# Patient Record
Sex: FEMALE | Race: WHITE | NOT HISPANIC OR LATINO | Employment: UNEMPLOYED | URBAN - METROPOLITAN AREA
[De-identification: names, ages, dates, MRNs, and addresses within clinical notes are randomized per-mention and may not be internally consistent; named-entity substitution may affect disease eponyms.]

---

## 2017-12-04 ENCOUNTER — TELEPHONE (OUTPATIENT)
Dept: OBSTETRICS AND GYNECOLOGY | Facility: CLINIC | Age: 47
End: 2017-12-04

## 2017-12-04 NOTE — TELEPHONE ENCOUNTER
----- Message from Martina Romano sent at 12/4/2017  9:35 AM CST -----  Contact: Self  Pt has OV scheduled today and states she has stared her cycle. Pt wants to know if she needs to reschedule. Pt can be reached @ [phone].

## 2017-12-19 ENCOUNTER — OFFICE VISIT (OUTPATIENT)
Dept: OBSTETRICS AND GYNECOLOGY | Facility: CLINIC | Age: 47
End: 2017-12-19
Payer: MEDICAID

## 2017-12-19 VITALS
DIASTOLIC BLOOD PRESSURE: 72 MMHG | HEIGHT: 63 IN | TEMPERATURE: 99 F | SYSTOLIC BLOOD PRESSURE: 124 MMHG | WEIGHT: 236.31 LBS | BODY MASS INDEX: 41.87 KG/M2

## 2017-12-19 DIAGNOSIS — E66.01 OBESITY, CLASS III, BMI 40-49.9 (MORBID OBESITY): ICD-10-CM

## 2017-12-19 DIAGNOSIS — N92.1 MENORRHAGIA WITH IRREGULAR CYCLE: ICD-10-CM

## 2017-12-19 DIAGNOSIS — E03.9 ACQUIRED HYPOTHYROIDISM: ICD-10-CM

## 2017-12-19 DIAGNOSIS — D64.9 ANEMIA, UNSPECIFIED TYPE: ICD-10-CM

## 2017-12-19 DIAGNOSIS — E11.9 TYPE 2 DIABETES MELLITUS WITHOUT COMPLICATION, WITHOUT LONG-TERM CURRENT USE OF INSULIN: ICD-10-CM

## 2017-12-19 DIAGNOSIS — Z01.419 WELL WOMAN EXAM WITH ROUTINE GYNECOLOGICAL EXAM: Primary | ICD-10-CM

## 2017-12-19 DIAGNOSIS — I10 ESSENTIAL HYPERTENSION: ICD-10-CM

## 2017-12-19 PROCEDURE — 88175 CYTOPATH C/V AUTO FLUID REDO: CPT

## 2017-12-19 PROCEDURE — 99213 OFFICE O/P EST LOW 20 MIN: CPT | Mod: PBBFAC | Performed by: OBSTETRICS & GYNECOLOGY

## 2017-12-19 PROCEDURE — 99386 PREV VISIT NEW AGE 40-64: CPT | Mod: S$PBB,,, | Performed by: OBSTETRICS & GYNECOLOGY

## 2017-12-19 PROCEDURE — 99999 PR PBB SHADOW E&M-EST. PATIENT-LVL III: CPT | Mod: PBBFAC,,, | Performed by: OBSTETRICS & GYNECOLOGY

## 2017-12-19 RX ORDER — HYDROCHLOROTHIAZIDE 12.5 MG/1
12.5 CAPSULE ORAL DAILY
Refills: 4 | Status: ON HOLD | COMMUNITY
Start: 2017-11-08 | End: 2018-07-27 | Stop reason: HOSPADM

## 2017-12-19 RX ORDER — OMEPRAZOLE 20 MG/1
20 CAPSULE, DELAYED RELEASE ORAL DAILY
Refills: 4 | Status: ON HOLD | COMMUNITY
Start: 2017-11-08 | End: 2018-07-27 | Stop reason: HOSPADM

## 2017-12-19 RX ORDER — IBUPROFEN 600 MG/1
600 TABLET ORAL 3 TIMES DAILY
Refills: 2 | Status: ON HOLD | COMMUNITY
Start: 2017-11-14 | End: 2018-07-27 | Stop reason: HOSPADM

## 2017-12-19 NOTE — PATIENT INSTRUCTIONS
Please call [phone] to schedule pelvic ultrasound  Back in about 4 weeks for follow-up and endometrial biopsy

## 2017-12-19 NOTE — PROGRESS NOTES
Subjective:       Patient ID: Nicolle Kirk is a 47 y.o. female.    Chief Complaint:  Gynecologic Exam (NP complaining of frequent bleeding for her cycle)      History of Present Illness  HPI  Annual Exam-Premenopausal  Patient presents for annual exam. The patient has multiple complaints today. The patient is sexually active. GYN screening history: last pap: patient does not recall when last pap was and last mammogram: approximate date 2017 at [facility] and was normal. The patient wears seatbelts: yes. The patient participates in regular exercise: yes. Has the patient ever been transfused or tattooed?: yes. The patient reports that there is not domestic violence in her life.    Patient with history of menorrhagia, bleeding every two weeks or less.  History of hypertension, type 2 diabetes mellitus, hypothyroidism and severe obesity.  New partner for the past year.    Blood work in 2017.  HgbA1c- 6.1  TSH- 12.3  Hematocrit- 28  EKG/CXR- Neg/Neg    Status post  sections with tubal ligation    GYN & OB History  Patient's last menstrual period was 2017 (exact date).   Date of Last Pap: No result found    OB History    Para Term  AB Living   3 3 2 1   4   SAB TAB Ectopic Multiple Live Births         1 4      # Outcome Date GA Lbr [place]/2nd Weight Sex Delivery Anes PTL Lv   3A   35w0d  2.977 kg (6 lb 9 oz) F CS-LTranv  Y MIRA   3B   35w0d  3.005 kg (6 lb 10 oz) M CS-LTranv  Y MIRA   2 Term  38w0d  2.977 kg (6 lb 9 oz) F CS-LTranv  N MIRA   1 Term  40w0d  2.92 kg (6 lb 7 oz) F Vag-Spont  N MIRA          Review of Systems  Review of Systems   Constitutional: Positive for fatigue. Negative for activity change, appetite change, chills, fever and unexpected weight change.   HENT: Negative for mouth sores.    Respiratory: Negative for cough, shortness of breath and wheezing.    Cardiovascular: Negative for chest pain and palpitations.    Gastrointestinal: Positive for abdominal pain. Negative for bloating, blood in stool, constipation, nausea and vomiting.   Endocrine: Negative for diabetes and hot flashes.   Genitourinary: Positive for menorrhagia, menstrual problem, pelvic pain, vaginal bleeding and dysmenorrhea. Negative for dyspareunia, dysuria, frequency, hematuria, urgency, vaginal discharge, vaginal pain, urinary incontinence, postcoital bleeding and vaginal odor.   Musculoskeletal: Negative for back pain and myalgias.   Skin:  Negative for rash.   Neurological: Negative for seizures and headaches.   Psychiatric/Behavioral: Negative for depression and sleep disturbance. The patient is not nervous/anxious.    Breast: Negative for breast mass, breast pain and nipple discharge          Objective:    Physical Exam:   Constitutional: She appears well-developed and well-nourished. No distress.    HENT:   Head: Normocephalic and atraumatic.    Eyes: EOM are normal.    Neck: Normal range of motion.     Pulmonary/Chest: Effort normal. No respiratory distress.   Breasts: Non-tender, no engorgement, no masses, no retraction, no discharge. Negative for lymphadenopathy.         Abdominal: Soft. She exhibits no distension. There is no tenderness. There is no rebound and no guarding.   Pfannenstiel scars healed well.     Genitourinary: Vagina normal. No vaginal discharge found.   Genitourinary Comments: Vulva without any obvious lesions.  Vaginal vault with good support.  Minimal clear mucus discharge noted.  No obvious lesion.  Cervix is without any cervical motion tenderness.  No obvious lesion.  Uterus and  Adnexa are difficult to assess secondary to body habitus           Musculoskeletal: Normal range of motion.       Neurological: She is alert.    Skin: Skin is warm and dry.    Psychiatric: She has a normal mood and affect.          Assessment:        1. Well woman exam with routine gynecological exam    2. Essential hypertension    3. Obesity, Class  III, BMI 40-49.9 (morbid obesity)    4. Type 2 diabetes mellitus without complication, without long-term current use of insulin    5. Acquired hypothyroidism    6.  Menorrhagia and Anemia.  Possible uterine fibroids          Plan:          I have discussed with the patient her condition.  Monthly breast examination was instructed, discussed, and encouraged.  Patient was encouraged to consume a low-calorie, low fat diet, and to increase of physical activity.  Healthy habits encouraged.  She would need to lose weight.  A Pap smear was performed.  Mammogram was not ordered because it was done within a year.  Gonorrhea and Chlamydia testing not performed.  HIV test not ordered.  Patient is to continue her medications as prescribed by her PCP.  She will come back to see me in one year for her annual visit.  She can come back to see me sooner as necessary.  All of her questions were answered appropriately to her satisfaction.    She would need a pelvic ultrasound and EMB soon  Back for EMB    Might need hysterectomy

## 2018-01-16 ENCOUNTER — TELEPHONE (OUTPATIENT)
Dept: OBSTETRICS AND GYNECOLOGY | Facility: CLINIC | Age: 48
End: 2018-01-16

## 2018-01-16 NOTE — TELEPHONE ENCOUNTER
Spoke with pt. Pt could not make todays appointment due to having the flu. Pt canceled appointment and stated she will call us to reschedule.

## 2018-01-16 NOTE — TELEPHONE ENCOUNTER
----- Message from Martina Romano sent at 1/16/2018 12:31 PM CST -----  Contact: Self  Pt returned [phone].

## 2018-01-29 ENCOUNTER — TELEPHONE (OUTPATIENT)
Dept: OBSTETRICS AND GYNECOLOGY | Facility: CLINIC | Age: 48
End: 2018-01-29

## 2018-01-29 NOTE — TELEPHONE ENCOUNTER
Contacted pt to reschedule appointment. Pt stated she will return our call and schedule EMBX once she gets her ultrasound

## 2018-03-23 ENCOUNTER — HOSPITAL ENCOUNTER (OUTPATIENT)
Dept: RADIOLOGY | Facility: HOSPITAL | Age: 48
Discharge: HOME | End: 2018-03-23
Attending: OBSTETRICS & GYNECOLOGY
Payer: MEDICAID

## 2018-03-23 DIAGNOSIS — N92.1 MENORRHAGIA WITH IRREGULAR CYCLE: ICD-10-CM

## 2018-03-23 DIAGNOSIS — D64.9 ANEMIA, UNSPECIFIED TYPE: ICD-10-CM

## 2018-03-23 PROCEDURE — 76830 TRANSVAGINAL US NON-OB: CPT | Mod: TC

## 2018-03-23 PROCEDURE — 76856 US EXAM PELVIC COMPLETE: CPT | Mod: 26,,, | Performed by: RADIOLOGY

## 2018-03-23 PROCEDURE — 76830 TRANSVAGINAL US NON-OB: CPT | Mod: 26,,, | Performed by: RADIOLOGY

## 2018-03-26 ENCOUNTER — TELEPHONE (OUTPATIENT)
Dept: OBSTETRICS AND GYNECOLOGY | Facility: CLINIC | Age: 48
End: 2018-03-26

## 2018-03-26 NOTE — TELEPHONE ENCOUNTER
Notes recorded by Tash Vasquez MA on 3/26/2018 at 11:15 AM CDT  Patient aware  would like to discuss results at visit. Patient will call to schedule appointment

## 2018-03-26 NOTE — TELEPHONE ENCOUNTER
----- Message from Michael Childress MD sent at 3/25/2018  3:23 PM CDT -----  Will discuss on return visit    Michael Childress MD

## 2018-04-19 ENCOUNTER — OFFICE VISIT (OUTPATIENT)
Dept: OBSTETRICS AND GYNECOLOGY | Facility: CLINIC | Age: 48
End: 2018-04-19
Payer: MEDICAID

## 2018-04-19 VITALS
TEMPERATURE: 99 F | HEIGHT: 63 IN | SYSTOLIC BLOOD PRESSURE: 132 MMHG | WEIGHT: 248.69 LBS | DIASTOLIC BLOOD PRESSURE: 70 MMHG | BODY MASS INDEX: 44.06 KG/M2

## 2018-04-19 DIAGNOSIS — E66.01 OBESITY, CLASS III, BMI 40-49.9 (MORBID OBESITY): ICD-10-CM

## 2018-04-19 DIAGNOSIS — D25.9 UTERINE LEIOMYOMA, UNSPECIFIED LOCATION: Primary | ICD-10-CM

## 2018-04-19 DIAGNOSIS — I10 ESSENTIAL HYPERTENSION: ICD-10-CM

## 2018-04-19 DIAGNOSIS — E11.9 TYPE 2 DIABETES MELLITUS WITHOUT COMPLICATION, WITHOUT LONG-TERM CURRENT USE OF INSULIN: ICD-10-CM

## 2018-04-19 DIAGNOSIS — E03.9 ACQUIRED HYPOTHYROIDISM: ICD-10-CM

## 2018-04-19 PROCEDURE — 99213 OFFICE O/P EST LOW 20 MIN: CPT | Mod: PBBFAC | Performed by: OBSTETRICS & GYNECOLOGY

## 2018-04-19 PROCEDURE — 99213 OFFICE O/P EST LOW 20 MIN: CPT | Mod: S$PBB,,, | Performed by: OBSTETRICS & GYNECOLOGY

## 2018-04-19 PROCEDURE — 99999 PR PBB SHADOW E&M-EST. PATIENT-LVL III: CPT | Mod: PBBFAC,,, | Performed by: OBSTETRICS & GYNECOLOGY

## 2018-04-19 RX ORDER — CETIRIZINE HYDROCHLORIDE 10 MG/1
TABLET ORAL
Status: ON HOLD | COMMUNITY
Start: 2018-04-19 | End: 2018-07-27 | Stop reason: HOSPADM

## 2018-04-19 NOTE — PROGRESS NOTES
Subjective:       Patient ID: Nicolle Kirk is a 47 y.o. female.    Chief Complaint:  Follow-up (follow up ultrasound on 18)      History of Present Illness  HPI  Patient comes in today with her partner for follow-up  Still with menorrhagia and dysmenorrhea    Pelvic ultrasound on 3/23/2018 shows  There is a lobulated uterus that measures approximately 13.5 x 10.4 x 8.7 cm with significant distortion of the endometrial stripe.  There is a large heterogeneous uterine fibroid along the posterior body of the uterus measuring approximately 8.1 x 8.4 x 6.7 cm.  The large uterine fibroid displaces and distorts the endometrial stripe.  In the anterior lower uterine segment there is a small fibroid that measures 2 x 3 x 2.4 cm.  The right ovary measures 3.8 x 3.2 x 2.7 cm.  Multiple follicles or a large septated cyst measures approximately 3.2 x 2.5 x 2 cm.  No abnormal fluid collections in the adnexa.  The left ovary measures 3.3 x 2.85 cm with normal vascular flow.    Previous  sections x 2 with tubal ligation      GYN & OB History  Patient's last menstrual period was 2018 (within days).   Date of Last Pap: 2017    OB History    Para Term  AB Living   3 3 2 1   4   SAB TAB Ectopic Multiple Live Births         1 4      # Outcome Date GA Lbr [place]/2nd Weight Sex Delivery Anes PTL Lv   3A   35w0d  2.977 kg (6 lb 9 oz) F CS-LTranv  Y MIRA   3B   35w0d  3.005 kg (6 lb 10 oz) M CS-LTranv  Y MIRA   2 Term  38w0d  2.977 kg (6 lb 9 oz) F CS-LTranv  N MIRA   1 Term  40w0d  2.92 kg (6 lb 7 oz) F Vag-Spont  N MIRA        Past Medical History:   Diagnosis Date    Diabetes mellitus     Fibroids     Hypertension     Obesity, Class III, BMI 40-49.9 (morbid obesity)     Thyroid disease        Past Surgical History:   Procedure Laterality Date     SECTION      ORIF left arm Left 2012    Fall on wet floor    TUBAL LIGATION         Family History   Problem  Relation Age of Onset    Hyperthyroidism Paternal Grandmother     Diabetes Father     Hypertension Father     Diabetes Mother     Hypertension Mother        Social History     Social History    Marital status:      Spouse name: N/A    Number of children: N/A    Years of education: N/A     Social History Main Topics    Smoking status: Never Smoker    Smokeless tobacco: Never Used    Alcohol use Yes      Comment: occasionally    Drug use: Yes     Types: Marijuana    Sexual activity: Yes     Partners: Male     Birth control/ protection: See Surgical Hx     Other Topics Concern    None     Social History Narrative    Together since 7/1/2016    He is a .  Now working as a     She is a homemaker       Current Outpatient Prescriptions   Medication Sig Dispense Refill    cetirizine (ZYRTEC) 10 MG tablet       hydroCHLOROthiazide (MICROZIDE) 12.5 mg capsule Take 12.5 mg by mouth once daily.  4    ibuprofen (ADVIL,MOTRIN) 600 MG tablet Take 600 mg by mouth 3 (three) times daily.  2    omeprazole (PRILOSEC) 20 MG capsule Take 20 mg by mouth once daily.  4     No current facility-administered medications for this visit.        Review of patient's allergies indicates:  No Known Allergies    Review of Systems  Review of Systems   Constitutional: Positive for fatigue. Negative for activity change, appetite change, chills, fever and unexpected weight change.   HENT: Negative for mouth sores.    Respiratory: Negative for cough, shortness of breath and wheezing.    Cardiovascular: Negative for chest pain and palpitations.   Gastrointestinal: Positive for abdominal pain. Negative for bloating, blood in stool, constipation, nausea and vomiting.   Endocrine: Negative for diabetes and hot flashes.   Genitourinary: Positive for dyspareunia, menorrhagia, menstrual problem, pelvic pain and dysmenorrhea. Negative for dysuria, frequency, hematuria, urgency, vaginal bleeding, vaginal discharge,  vaginal pain, urinary incontinence, postcoital bleeding and vaginal odor.   Musculoskeletal: Negative for back pain and myalgias.   Skin:  Negative for rash.   Neurological: Negative for seizures and headaches.   Psychiatric/Behavioral: Negative for depression and sleep disturbance. The patient is not nervous/anxious.    Breast: Negative for breast mass, breast pain and nipple discharge          Objective:    Physical Exam:   Constitutional: She appears well-developed and well-nourished. No distress.   Obese      HENT:   Head: Normocephalic and atraumatic.    Eyes: EOM are normal.    Neck: Normal range of motion.    Cardiovascular: Normal rate.     Pulmonary/Chest: Effort normal. No respiratory distress.                  Musculoskeletal: Normal range of motion.       Neurological: She is alert.    Skin: Skin is warm and dry.    Psychiatric: She has a normal mood and affect.          Assessment:        1. Uterine leiomyoma, unspecified location    2. Obesity, Class III, BMI 40-49.9 (morbid obesity)    3. Essential hypertension    4. Type 2 diabetes mellitus without complication, without long-term current use of insulin    5. Acquired hypothyroidism              Plan:      I have extensively discussed with the patient and her partner regarding her condition  She would eventually need total abdominal hysterectomy with bilateral salpingectomy  Patient with multiple medical problems  Will perform basic blood work  CBC, CMP, TSH/T4, Hemoglobin A1c  Back in 2 weeks to schedule surgery

## 2018-04-20 ENCOUNTER — TELEPHONE (OUTPATIENT)
Dept: OBSTETRICS AND GYNECOLOGY | Facility: CLINIC | Age: 48
End: 2018-04-20

## 2018-04-20 ENCOUNTER — LAB VISIT (OUTPATIENT)
Dept: LAB | Facility: HOSPITAL | Age: 48
End: 2018-04-20
Attending: OBSTETRICS & GYNECOLOGY
Payer: MEDICAID

## 2018-04-20 DIAGNOSIS — E03.9 ACQUIRED HYPOTHYROIDISM: ICD-10-CM

## 2018-04-20 DIAGNOSIS — E11.9 TYPE 2 DIABETES MELLITUS WITHOUT COMPLICATION, WITHOUT LONG-TERM CURRENT USE OF INSULIN: ICD-10-CM

## 2018-04-20 DIAGNOSIS — D25.9 UTERINE LEIOMYOMA, UNSPECIFIED LOCATION: ICD-10-CM

## 2018-04-20 LAB
ALBUMIN SERPL BCP-MCNC: 3.4 G/DL
ALP SERPL-CCNC: 79 U/L
ALT SERPL W/O P-5'-P-CCNC: 7 U/L
ANION GAP SERPL CALC-SCNC: 5 MMOL/L
ANISOCYTOSIS BLD QL SMEAR: SLIGHT
AST SERPL-CCNC: 11 U/L
BASOPHILS # BLD AUTO: 0.03 K/UL
BASOPHILS NFR BLD: 0.3 %
BILIRUB SERPL-MCNC: 0.3 MG/DL
BUN SERPL-MCNC: 17 MG/DL
CALCIUM SERPL-MCNC: 9.8 MG/DL
CHLORIDE SERPL-SCNC: 104 MMOL/L
CO2 SERPL-SCNC: 28 MMOL/L
CREAT SERPL-MCNC: 0.8 MG/DL
DIFFERENTIAL METHOD: ABNORMAL
EOSINOPHIL # BLD AUTO: 0.4 K/UL
EOSINOPHIL NFR BLD: 3.7 %
ERYTHROCYTE [DISTWIDTH] IN BLOOD BY AUTOMATED COUNT: 18.6 %
EST. GFR  (AFRICAN AMERICAN): >60 ML/MIN/1.73 M^2
EST. GFR  (NON AFRICAN AMERICAN): >60 ML/MIN/1.73 M^2
ESTIMATED AVG GLUCOSE: 117 MG/DL
GLUCOSE SERPL-MCNC: 102 MG/DL
HBA1C MFR BLD HPLC: 5.7 %
HCT VFR BLD AUTO: 28.8 %
HGB BLD-MCNC: 8 G/DL
HYPOCHROMIA BLD QL SMEAR: ABNORMAL
LYMPHOCYTES # BLD AUTO: 3 K/UL
LYMPHOCYTES NFR BLD: 25.4 %
MCH RBC QN AUTO: 18.5 PG
MCHC RBC AUTO-ENTMCNC: 27.8 G/DL
MCV RBC AUTO: 67 FL
MONOCYTES # BLD AUTO: 1 K/UL
MONOCYTES NFR BLD: 8.9 %
NEUTROPHILS # BLD AUTO: 7.2 K/UL
NEUTROPHILS NFR BLD: 61.7 %
OVALOCYTES BLD QL SMEAR: ABNORMAL
PLATELET # BLD AUTO: 470 K/UL
PLATELET BLD QL SMEAR: ABNORMAL
PMV BLD AUTO: 9.3 FL
POIKILOCYTOSIS BLD QL SMEAR: SLIGHT
POLYCHROMASIA BLD QL SMEAR: ABNORMAL
POTASSIUM SERPL-SCNC: 4.6 MMOL/L
PROT SERPL-MCNC: 7.3 G/DL
RBC # BLD AUTO: 4.32 M/UL
SODIUM SERPL-SCNC: 137 MMOL/L
STOMATOCYTES BLD QL SMEAR: PRESENT
T4 FREE SERPL-MCNC: 0.92 NG/DL
TARGETS BLD QL SMEAR: ABNORMAL
TSH SERPL DL<=0.005 MIU/L-ACNC: 5.84 UIU/ML
WBC # BLD AUTO: 11.75 K/UL

## 2018-04-20 PROCEDURE — 36415 COLL VENOUS BLD VENIPUNCTURE: CPT

## 2018-04-20 PROCEDURE — 84443 ASSAY THYROID STIM HORMONE: CPT

## 2018-04-20 PROCEDURE — 80053 COMPREHEN METABOLIC PANEL: CPT

## 2018-04-20 PROCEDURE — 83036 HEMOGLOBIN GLYCOSYLATED A1C: CPT

## 2018-04-20 PROCEDURE — 85025 COMPLETE CBC W/AUTO DIFF WBC: CPT

## 2018-04-20 PROCEDURE — 84439 ASSAY OF FREE THYROXINE: CPT

## 2018-04-20 NOTE — TELEPHONE ENCOUNTER
Pt has been informed to purchase iron supplement over the counter to take two times a day and recheck in 1 month.  Pt voiced understanding. jose

## 2018-04-20 NOTE — TELEPHONE ENCOUNTER
----- Message from Michael Childress MD sent at 4/20/2018  8:03 AM CDT -----  Patient is anemic  She would need over-the-counter iron supplement twice a day  We will re-check her hematocrit in about a month

## 2018-05-21 ENCOUNTER — TELEPHONE (OUTPATIENT)
Dept: OBSTETRICS AND GYNECOLOGY | Facility: CLINIC | Age: 48
End: 2018-05-21

## 2018-05-21 NOTE — TELEPHONE ENCOUNTER
----- Message from Eugenia Gonzáles sent at 5/21/2018  9:15 AM CDT -----  Contact: Self/ [phone]  Pt calling to follow up with Dr. Childress about scheduling her hysterectomy.    Patient is aware that she has surgery consult on 6/4/2018

## 2018-06-04 ENCOUNTER — OFFICE VISIT (OUTPATIENT)
Dept: OBSTETRICS AND GYNECOLOGY | Facility: CLINIC | Age: 48
End: 2018-06-04
Payer: MEDICAID

## 2018-06-04 VITALS
BODY MASS INDEX: 44.76 KG/M2 | WEIGHT: 252.63 LBS | HEIGHT: 63 IN | TEMPERATURE: 99 F | DIASTOLIC BLOOD PRESSURE: 88 MMHG | SYSTOLIC BLOOD PRESSURE: 142 MMHG

## 2018-06-04 DIAGNOSIS — E11.9 TYPE 2 DIABETES MELLITUS WITHOUT COMPLICATION, WITHOUT LONG-TERM CURRENT USE OF INSULIN: ICD-10-CM

## 2018-06-04 DIAGNOSIS — E66.01 OBESITY, CLASS III, BMI 40-49.9 (MORBID OBESITY): ICD-10-CM

## 2018-06-04 DIAGNOSIS — E03.9 ACQUIRED HYPOTHYROIDISM: ICD-10-CM

## 2018-06-04 DIAGNOSIS — I10 ESSENTIAL HYPERTENSION: ICD-10-CM

## 2018-06-04 DIAGNOSIS — D25.9 UTERINE LEIOMYOMA, UNSPECIFIED LOCATION: Primary | ICD-10-CM

## 2018-06-04 PROCEDURE — 99213 OFFICE O/P EST LOW 20 MIN: CPT | Mod: PBBFAC | Performed by: OBSTETRICS & GYNECOLOGY

## 2018-06-04 PROCEDURE — 99213 OFFICE O/P EST LOW 20 MIN: CPT | Mod: S$PBB,,, | Performed by: OBSTETRICS & GYNECOLOGY

## 2018-06-04 PROCEDURE — 99999 PR PBB SHADOW E&M-EST. PATIENT-LVL III: CPT | Mod: PBBFAC,,, | Performed by: OBSTETRICS & GYNECOLOGY

## 2018-06-04 RX ORDER — FERROUS SULFATE 324(65)MG
325 TABLET, DELAYED RELEASE (ENTERIC COATED) ORAL DAILY
COMMUNITY
End: 2021-03-04

## 2018-06-04 RX ORDER — LEVOTHYROXINE SODIUM 100 UG/1
TABLET ORAL
Refills: 2 | COMMUNITY
Start: 2018-05-09

## 2018-06-04 NOTE — PATIENT INSTRUCTIONS
You are scheduled for total abdominal hysterectomy with bilateral salpingectomy on July 23, 2018  Please get surgical clearance from your Primary Care Physician  Please come back on 7/16/2018 for preop

## 2018-06-04 NOTE — PROGRESS NOTES
Subjective:       Patient ID: Nicolle Kirk is a 47 y.o. female.    Chief Complaint:  Consult (surgery consult)      History of Present Illness  HPI  Patient comes in today with her mother for follow-up, requesting surgery  Still with menorrhagia and dysmenorrhea     Pelvic ultrasound on 3/23/2018 shows  There is a lobulated uterus that measures approximately 13.5 x 10.4 x 8.7 cm with significant distortion of the endometrial stripe.  There is a large heterogeneous uterine fibroid along the posterior body of the uterus measuring approximately 8.1 x 8.4 x 6.7 cm.  The large uterine fibroid displaces and distorts the endometrial stripe.  In the anterior lower uterine segment there is a small fibroid that measures 2 x 3 x 2.4 cm.  The right ovary measures 3.8 x 3.2 x 2.7 cm.  Multiple follicles or a large septated cyst measures approximately 3.2 x 2.5 x 2 cm.  No abnormal fluid collections in the adnexa.  The left ovary measures 3.3 x 2.85 cm with normal vascular flow.     Previous  sections x 2 with tubal ligation    Severe obesity, hypothyroidism, type 2 Diabetes Mellitus, chronic hypertension, anemia.      GYN & OB History  Patient's last menstrual period was 2018 (exact date).   Date of Last Pap: 2017    OB History    Para Term  AB Living   3 3 2 1   4   SAB TAB Ectopic Multiple Live Births         1 4      # Outcome Date GA Lbr [place]/2nd Weight Sex Delivery Anes PTL Lv   3A   35w0d  2.977 kg (6 lb 9 oz) F CS-LTranv  Y MIRA   3B   35w0d  3.005 kg (6 lb 10 oz) M CS-LTranv  Y MIRA   2 Term  38w0d  2.977 kg (6 lb 9 oz) F CS-LTranv  N MIRA   1 Term  40w0d  2.92 kg (6 lb 7 oz) F Vag-Spont  N MIRA        Past Medical History:   Diagnosis Date    Diabetes mellitus     Fibroids     Hypertension     Obesity, Class III, BMI 40-49.9 (morbid obesity)     Thyroid disease        Past Surgical History:   Procedure Laterality Date     SECTION      ORIF left  arm Left 07/2012    Fall on wet floor    TUBAL LIGATION         Family History   Problem Relation Age of Onset    Hyperthyroidism Paternal Grandmother     Diabetes Father     Hypertension Father     Diabetes Mother     Hypertension Mother        Social History     Social History    Marital status:      Spouse name: N/A    Number of children: N/A    Years of education: N/A     Social History Main Topics    Smoking status: Never Smoker    Smokeless tobacco: Never Used    Alcohol use Yes      Comment: occasionally    Drug use: Yes     Types: Marijuana    Sexual activity: Yes     Partners: Male     Birth control/ protection: See Surgical Hx     Other Topics Concern    None     Social History Narrative    Together since 7/1/2016    He is a .  Now working as a     She is a homemaker       Current Outpatient Prescriptions   Medication Sig Dispense Refill    cetirizine (ZYRTEC) 10 MG tablet       ferrous sulfate 324 mg (65 mg iron) TbEC Take 325 mg by mouth once daily.      hydroCHLOROthiazide (MICROZIDE) 12.5 mg capsule Take 12.5 mg by mouth once daily.  4    ibuprofen (ADVIL,MOTRIN) 600 MG tablet Take 600 mg by mouth 3 (three) times daily.  2    levothyroxine (SYNTHROID) 100 MCG tablet TAKE 1 TABLET BY MOUTH EVERY DAY ON EMPTY STOMACH IN THE MORNING  2    omeprazole (PRILOSEC) 20 MG capsule Take 20 mg by mouth once daily.  4     No current facility-administered medications for this visit.        Review of patient's allergies indicates:  No Known Allergies     Review of Systems  Review of Systems   Constitutional: Positive for fatigue. Negative for activity change, appetite change, chills, fever and unexpected weight change.   HENT: Negative for mouth sores.    Respiratory: Negative for cough, shortness of breath and wheezing.    Cardiovascular: Negative for chest pain and palpitations.   Gastrointestinal: Positive for abdominal pain. Negative for bloating, blood in stool,  constipation, nausea and vomiting.   Endocrine: Negative for diabetes and hot flashes.   Genitourinary: Positive for dyspareunia, menorrhagia, menstrual problem, pelvic pain and dysmenorrhea. Negative for dysuria, frequency, hematuria, urgency, vaginal bleeding, vaginal discharge, vaginal pain, urinary incontinence, postcoital bleeding and vaginal odor.   Musculoskeletal: Negative for back pain and myalgias.   Skin:  Negative for rash.   Neurological: Negative for seizures and headaches.   Psychiatric/Behavioral: Negative for depression and sleep disturbance. The patient is not nervous/anxious.    Breast: Negative for breast mass, breast pain and nipple discharge          Objective:    Physical Exam:   Constitutional: She appears well-developed and well-nourished. No distress.   Obese      HENT:   Head: Normocephalic and atraumatic.    Eyes: EOM are normal.    Neck: Normal range of motion.     Pulmonary/Chest: Effort normal. No respiratory distress.   Breasts: Non-tender, no engorgement, no masses, no retraction, no discharge. Negative for lymphadenopathy.         Abdominal: Soft. She exhibits no distension. There is no tenderness. There is no rebound and no guarding.   Pfannenstiel scars healed well.     Genitourinary: Vagina normal. No vaginal discharge found.   Genitourinary Comments: Vulva without any obvious lesions.  Vaginal vault with good support.  Minimal clear mucus discharge noted.  No obvious lesion.  Cervix is without any cervical motion tenderness.  No obvious lesion.  Uterus and  Adnexa are difficult to assess secondary to body habitus           Musculoskeletal: Normal range of motion.       Neurological: She is alert.    Skin: Skin is warm and dry.    Psychiatric: She has a normal mood and affect.          Assessment:        1. Uterine leiomyoma, unspecified location    2. Obesity, Class III, BMI 40-49.9 (morbid obesity)    3. Essential hypertension    4. Type 2 diabetes mellitus without  complication, without long-term current use of insulin    5. Acquired hypothyroidism             Plan:      I have extensively discussed with the patient and her mother regarding her condition  Review of her recent blood work.  She will go to her PCP to get surgical clearance  She was then scheduled for total abdominal hysterectomy with bilateral salpingectomy on 7/23/2018 at 0730 AM  Preop on 7/16/2018    She will increase her iron supplement to twice a day.  Her hematocrit in April 2018 was 28

## 2018-07-16 ENCOUNTER — OFFICE VISIT (OUTPATIENT)
Dept: OBSTETRICS AND GYNECOLOGY | Facility: CLINIC | Age: 48
End: 2018-07-16
Payer: MEDICAID

## 2018-07-16 VITALS
DIASTOLIC BLOOD PRESSURE: 82 MMHG | TEMPERATURE: 99 F | BODY MASS INDEX: 44.3 KG/M2 | SYSTOLIC BLOOD PRESSURE: 130 MMHG | HEIGHT: 63 IN | WEIGHT: 250 LBS

## 2018-07-16 DIAGNOSIS — E11.9 TYPE 2 DIABETES MELLITUS WITHOUT COMPLICATION, WITHOUT LONG-TERM CURRENT USE OF INSULIN: ICD-10-CM

## 2018-07-16 DIAGNOSIS — E66.01 OBESITY, CLASS III, BMI 40-49.9 (MORBID OBESITY): ICD-10-CM

## 2018-07-16 DIAGNOSIS — I10 ESSENTIAL HYPERTENSION: ICD-10-CM

## 2018-07-16 DIAGNOSIS — D25.9 UTERINE LEIOMYOMA, UNSPECIFIED LOCATION: Primary | ICD-10-CM

## 2018-07-16 PROCEDURE — 99499 UNLISTED E&M SERVICE: CPT | Mod: S$PBB,,, | Performed by: OBSTETRICS & GYNECOLOGY

## 2018-07-16 PROCEDURE — 99999 PR PBB SHADOW E&M-EST. PATIENT-LVL IV: CPT | Mod: PBBFAC,,, | Performed by: OBSTETRICS & GYNECOLOGY

## 2018-07-16 PROCEDURE — 99214 OFFICE O/P EST MOD 30 MIN: CPT | Mod: PBBFAC | Performed by: OBSTETRICS & GYNECOLOGY

## 2018-07-16 RX ORDER — SODIUM CHLORIDE, SODIUM LACTATE, POTASSIUM CHLORIDE, CALCIUM CHLORIDE 600; 310; 30; 20 MG/100ML; MG/100ML; MG/100ML; MG/100ML
INJECTION, SOLUTION INTRAVENOUS CONTINUOUS
Status: CANCELLED | OUTPATIENT
Start: 2018-07-16

## 2018-07-16 NOTE — PROGRESS NOTES
Subjective:       Patient ID: Nicolle Kirk is a 47 y.o. female.    Chief Complaint:  Pre-op Exam      History of Present Illness  HPI  Patient comes in today with her partner for preoperative consultation  Still with menorrhagia and dysmenorrhea     Pelvic ultrasound on 3/23/2018 shows  There is a lobulated uterus that measures approximately 13.5 x 10.4 x 8.7 cm with significant distortion of the endometrial stripe.  There is a large heterogeneous uterine fibroid along the posterior body of the uterus measuring approximately 8.1 x 8.4 x 6.7 cm.  The large uterine fibroid displaces and distorts the endometrial stripe.  In the anterior lower uterine segment there is a small fibroid that measures 2 x 3 x 2.4 cm.  The right ovary measures 3.8 x 3.2 x 2.7 cm.  Multiple follicles or a large septated cyst measures approximately 3.2 x 2.5 x 2 cm.  No abnormal fluid collections in the adnexa.  The left ovary measures 3.3 x 2.85 cm with normal vascular flow.     Previous  sections x 3 with tubal ligation     Severe obesity, hypothyroidism, type 2 Diabetes Mellitus, chronic hypertension, anemia.  Had surgical clearance from her PCP, Dr Brandon Sequeira.      GYN & OB History  Patient's last menstrual period was 2018 (within days).   Date of Last Pap: 2017    OB History    Para Term  AB Living   3 3 2 1   4   SAB TAB Ectopic Multiple Live Births         1 4      # Outcome Date GA Lbr [place]/2nd Weight Sex Delivery Anes PTL Lv   3A   35w0d  2.977 kg (6 lb 9 oz) F CS-LTranv  Y MIRA   3B   35w0d  3.005 kg (6 lb 10 oz) M CS-LTranv  Y MIRA   2 Term  38w0d  2.977 kg (6 lb 9 oz) F CS-LTranv  N MIRA   1 Term  40w0d  2.92 kg (6 lb 7 oz) F Vag-Spont  N MIRA        Past Medical History:   Diagnosis Date    Diabetes mellitus     Fibroids     Hypertension     Obesity, Class III, BMI 40-49.9 (morbid obesity)     Thyroid disease        Past Surgical History:   Procedure  Laterality Date     SECTION      ORIF left arm Left 2012    Fall on wet floor    TUBAL LIGATION         Family History   Problem Relation Age of Onset    Hyperthyroidism Paternal Grandmother     Diabetes Father     Hypertension Father     Diabetes Mother     Hypertension Mother        Social History     Social History    Marital status:      Spouse name: N/A    Number of children: N/A    Years of education: N/A     Social History Main Topics    Smoking status: Never Smoker    Smokeless tobacco: Never Used    Alcohol use Yes      Comment: occasionally    Drug use: Yes     Types: Marijuana    Sexual activity: Yes     Partners: Male     Birth control/ protection: See Surgical Hx     Other Topics Concern    None     Social History Narrative    Together since 2016    He is a .  Now working as a     She is a homemaker       Current Outpatient Prescriptions   Medication Sig Dispense Refill    cetirizine (ZYRTEC) 10 MG tablet       ferrous sulfate 324 mg (65 mg iron) TbEC Take 325 mg by mouth once daily.      hydroCHLOROthiazide (MICROZIDE) 12.5 mg capsule Take 12.5 mg by mouth once daily.  4    ibuprofen (ADVIL,MOTRIN) 600 MG tablet Take 600 mg by mouth 3 (three) times daily.  2    levothyroxine (SYNTHROID) 100 MCG tablet TAKE 1 TABLET BY MOUTH EVERY DAY ON EMPTY STOMACH IN THE MORNING  2    omeprazole (PRILOSEC) 20 MG capsule Take 20 mg by mouth once daily.  4     No current facility-administered medications for this visit.        Review of patient's allergies indicates:  No Known Allergies    Review of Systems  Review of Systems   Constitutional: Positive for fatigue. Negative for activity change, appetite change, chills, fever and unexpected weight change.   HENT: Negative for mouth sores.    Respiratory: Negative for cough, shortness of breath and wheezing.    Cardiovascular: Negative for chest pain and palpitations.   Gastrointestinal: Positive for  abdominal pain. Negative for bloating, blood in stool, constipation, nausea and vomiting.   Endocrine: Negative for diabetes and hot flashes.   Genitourinary: Positive for dyspareunia, menorrhagia, menstrual problem, pelvic pain and dysmenorrhea. Negative for dysuria, frequency, hematuria, urgency, vaginal bleeding, vaginal discharge, vaginal pain, urinary incontinence, postcoital bleeding and vaginal odor.   Musculoskeletal: Negative for back pain and myalgias.   Skin:  Negative for rash.   Neurological: Negative for seizures and headaches.   Psychiatric/Behavioral: Negative for depression and sleep disturbance. The patient is not nervous/anxious.    Breast: Negative for breast mass, breast pain and nipple discharge          Objective:    Physical Exam:   Constitutional: She appears well-developed and well-nourished. No distress.   Obese      HENT:   Head: Normocephalic and atraumatic.    Eyes: EOM are normal.    Neck: Normal range of motion.     Pulmonary/Chest: Effort normal. No respiratory distress.   Breasts: Non-tender, no engorgement, no masses, no retraction, no discharge. Negative for lymphadenopathy.         Abdominal: Soft. She exhibits no distension. There is no tenderness. There is no rebound and no guarding.   Pfannenstiel scars healed well.     Genitourinary: Vagina normal. No vaginal discharge found.   Genitourinary Comments: Vulva without any obvious lesions.  Vaginal vault with good support.  Minimal clear mucus discharge noted.  No obvious lesion.  Cervix is without any cervical motion tenderness.  No obvious lesion.  Uterus and  Adnexa are difficult to assess secondary to body habitus           Musculoskeletal: Normal range of motion.       Neurological: She is alert.    Skin: Skin is warm and dry.    Psychiatric: She has a normal mood and affect.          Assessment:        1. Uterine leiomyoma, unspecified location    2. Obesity, Class III, BMI 40-49.9 (morbid obesity)    3. Essential  hypertension    4. Type 2 diabetes mellitus without complication, without long-term current use of insulin              Plan:      I have again extensively discussed with the patient her condition.  The proposed procedures of total abdominal hysterectomy with bilateral salpingectomy explained to and again extensively discussed with the patient.  Questions answered.  Consents signed.  Orders written.   Patient will go over to the hospital for preoperative care prior to the day of admission.  She will undergo surgery on 7/25/2018 at 0930.

## 2018-07-19 ENCOUNTER — HOSPITAL ENCOUNTER (OUTPATIENT)
Dept: RADIOLOGY | Facility: HOSPITAL | Age: 48
Discharge: HOME | End: 2018-07-19
Attending: OBSTETRICS & GYNECOLOGY
Payer: MEDICAID

## 2018-07-19 ENCOUNTER — HOSPITAL ENCOUNTER (OUTPATIENT)
Dept: PREADMISSION TESTING | Facility: HOSPITAL | Age: 48
Discharge: HOME | End: 2018-07-19
Attending: OBSTETRICS & GYNECOLOGY
Payer: MEDICAID

## 2018-07-19 VITALS
TEMPERATURE: 98 F | BODY MASS INDEX: 43.83 KG/M2 | SYSTOLIC BLOOD PRESSURE: 128 MMHG | OXYGEN SATURATION: 97 % | RESPIRATION RATE: 17 BRPM | HEIGHT: 63 IN | HEART RATE: 91 BPM | WEIGHT: 247.38 LBS | DIASTOLIC BLOOD PRESSURE: 87 MMHG

## 2018-07-19 DIAGNOSIS — D25.9 UTERINE LEIOMYOMA, UNSPECIFIED LOCATION: ICD-10-CM

## 2018-07-19 DIAGNOSIS — E11.9 TYPE 2 DIABETES MELLITUS WITHOUT COMPLICATION, WITHOUT LONG-TERM CURRENT USE OF INSULIN: ICD-10-CM

## 2018-07-19 DIAGNOSIS — I10 ESSENTIAL HYPERTENSION: ICD-10-CM

## 2018-07-19 DIAGNOSIS — Z01.818 PRE-OP TESTING: Primary | ICD-10-CM

## 2018-07-19 LAB
ALBUMIN SERPL BCP-MCNC: 3.5 G/DL
ALP SERPL-CCNC: 77 U/L
ALT SERPL W/O P-5'-P-CCNC: 5 U/L
ANION GAP SERPL CALC-SCNC: 10 MMOL/L
AST SERPL-CCNC: 17 U/L
BASOPHILS # BLD AUTO: 0.03 K/UL
BASOPHILS NFR BLD: 0.2 %
BILIRUB SERPL-MCNC: 0.2 MG/DL
BUN SERPL-MCNC: 13 MG/DL
CALCIUM SERPL-MCNC: 10.9 MG/DL
CHLORIDE SERPL-SCNC: 103 MMOL/L
CO2 SERPL-SCNC: 24 MMOL/L
CREAT SERPL-MCNC: 0.8 MG/DL
DIFFERENTIAL METHOD: ABNORMAL
EOSINOPHIL # BLD AUTO: 0.6 K/UL
EOSINOPHIL NFR BLD: 5 %
ERYTHROCYTE [DISTWIDTH] IN BLOOD BY AUTOMATED COUNT: 18.7 %
EST. GFR  (AFRICAN AMERICAN): >60 ML/MIN/1.73 M^2
EST. GFR  (NON AFRICAN AMERICAN): >60 ML/MIN/1.73 M^2
GLUCOSE SERPL-MCNC: 113 MG/DL
HCT VFR BLD AUTO: 38.9 %
HGB BLD-MCNC: 12.8 G/DL
HIV1+2 IGG SERPL QL IA.RAPID: NEGATIVE
LYMPHOCYTES # BLD AUTO: 2.5 K/UL
LYMPHOCYTES NFR BLD: 20.7 %
MCH RBC QN AUTO: 26.3 PG
MCHC RBC AUTO-ENTMCNC: 32.9 G/DL
MCV RBC AUTO: 80 FL
MONOCYTES # BLD AUTO: 1.3 K/UL
MONOCYTES NFR BLD: 10.7 %
NEUTROPHILS # BLD AUTO: 7.6 K/UL
NEUTROPHILS NFR BLD: 63.2 %
PLATELET # BLD AUTO: 284 K/UL
PMV BLD AUTO: 10 FL
POTASSIUM SERPL-SCNC: 3.6 MMOL/L
PROT SERPL-MCNC: 7.9 G/DL
RBC # BLD AUTO: 4.87 M/UL
SODIUM SERPL-SCNC: 137 MMOL/L
WBC # BLD AUTO: 12.1 K/UL

## 2018-07-19 PROCEDURE — 93005 ELECTROCARDIOGRAM TRACING: CPT

## 2018-07-19 PROCEDURE — 80053 COMPREHEN METABOLIC PANEL: CPT

## 2018-07-19 PROCEDURE — 36415 COLL VENOUS BLD VENIPUNCTURE: CPT

## 2018-07-19 PROCEDURE — 85025 COMPLETE CBC W/AUTO DIFF WBC: CPT

## 2018-07-19 PROCEDURE — 93010 ELECTROCARDIOGRAM REPORT: CPT | Mod: ,,, | Performed by: INTERNAL MEDICINE

## 2018-07-19 PROCEDURE — 83036 HEMOGLOBIN GLYCOSYLATED A1C: CPT

## 2018-07-19 PROCEDURE — 86703 HIV-1/HIV-2 1 RESULT ANTBDY: CPT

## 2018-07-19 PROCEDURE — 71046 X-RAY EXAM CHEST 2 VIEWS: CPT | Mod: TC,FY

## 2018-07-19 PROCEDURE — 71046 X-RAY EXAM CHEST 2 VIEWS: CPT | Mod: 26,,, | Performed by: RADIOLOGY

## 2018-07-19 RX ORDER — METFORMIN HYDROCHLORIDE 500 MG/1
500 TABLET, EXTENDED RELEASE ORAL
Status: ON HOLD | COMMUNITY
End: 2018-07-27 | Stop reason: HOSPADM

## 2018-07-19 NOTE — PRE-PROCEDURE INSTRUCTIONS
Pre operative instructions, medication directives and pain scales/post op pain management discussed with patient.  Instructed to wash with Hibiclens prior to surgery. All questions were answered. Patient re-assured regarding surgical procedure and day of surgery routine as needed. Patient verbalized understanding of pre-op instructions.

## 2018-07-19 NOTE — DISCHARGE INSTRUCTIONS
Your surgery is scheduled for __Wednesday July 25, 2018__________________.    Call [phone] between 2 p.m. and 5 p.m. on   _Tuesday______________ to find out your arrival time for the day of your surgery.      Please report to SAME DAY SURGERY UNIT on the 2nd FLOOR at _______ a.m.  Use front door entrance. The doors open at 0530 am.      INSTRUCTIONS IMPORTANT!!!  ¨ Do not eat or drink after 12 midnight-including water. OK to brush teeth, no   gum, candy or mints!    ¨ Take only these medicines with a small swallow of water-morning of surgery.  Take Omeprazole with swallow of water      _x___  Return to Hospital Lab on _Monday July 23, 2018 at 10:00 AM__for additional blood test.    _x___  Prep instructions:   SHOWER     _x___  Please shower using Hibiclens soap the night before AND  the morning of  your surgery/procedure. Do not use Hibiclens on your face or genitals               If your surgery is around your belly button (Navel) be sure to wash inside your  belly button also. Rinse hibiclens off completely.  _x___  No shaving of procedural area at least 4-5 days before surgery due to increased risk of skin irritation and/or possible infection.  _x___  Do not wear makeup, including mascara. WEARING EYE MAKEUP MAY LEAD TO SERIOUS EYE INJURY during surgery.  _x___  No powder, lotions or creams to your body.  __x__  You may wear only deodorant on the day of surgery.  __x__  Please remove all jewelry, including piercings and leave at home.  _x___  No money or valuables needed. Please leave at home.  You may bring your cell phone.  ____  Please bring any documents given by your doctor.  _x___  If going home the same day, arrange for a ride home. You will not be able to   drive if Anesthesia was used.  ____  Children under 18 years require a parent / guardian present the entire time   they are in surgery / recovery.  _x___  Wear loose fitting clothing. Allow for dressings, bandages.  _x___  Stop Aspirin, Ibuprofen,  Motrin and Aleve at least 3-5 days before surgery, unless otherwise instructed by your doctor, or the nurse.              You MAY use Tylenol/acetaminophen until day of surgery.  _x___  If you take diabetic medication, do not take am of surgery unless instructed by Doctor.  _x___  Call MD for temperature above 101 degrees.        _x___ Stop taking any Fish Oil supplement or any Vitamins that contain Vitamin   E at least 5 days prior to surgery.          I have read or had read and explained to me, and understand the above information.  Additional comments or instructions:Please call   [phone] if you have any questions regarding the instructions above.

## 2018-07-20 LAB
ESTIMATED AVG GLUCOSE: 100 MG/DL
HBA1C MFR BLD HPLC: 5.1 %

## 2018-07-23 ENCOUNTER — LAB VISIT (OUTPATIENT)
Dept: LAB | Facility: HOSPITAL | Age: 48
End: 2018-07-23
Attending: OBSTETRICS & GYNECOLOGY
Payer: MEDICAID

## 2018-07-23 ENCOUNTER — TELEPHONE (OUTPATIENT)
Dept: OBSTETRICS AND GYNECOLOGY | Facility: CLINIC | Age: 48
End: 2018-07-23

## 2018-07-23 DIAGNOSIS — Z01.818 PRE-OP TESTING: ICD-10-CM

## 2018-07-23 LAB
ABO + RH BLD: NORMAL
B-HCG UR QL: NEGATIVE
BLD GP AB SCN CELLS X3 SERPL QL: NORMAL

## 2018-07-23 PROCEDURE — 36415 COLL VENOUS BLD VENIPUNCTURE: CPT

## 2018-07-23 PROCEDURE — 81025 URINE PREGNANCY TEST: CPT

## 2018-07-23 PROCEDURE — 86901 BLOOD TYPING SEROLOGIC RH(D): CPT

## 2018-07-23 NOTE — TELEPHONE ENCOUNTER
----- Message from Marla Cho sent at 7/23/2018 11:01 AM CDT -----  Contact: kartik  Pt would like to know if she can die her her. She states she just had a hysterectomy.  [phone].  -----------------------------------------------------------------  7/23/18  @ [phone] (vonnie)   SPOKE WITH MS CORADO, INFORMED HER THAT SHE CAN DIE HER HAIR BEFORE HER SURGERY, PT STATED HER UNDERSTANDING

## 2018-07-24 ENCOUNTER — TELEPHONE (OUTPATIENT)
Dept: OBSTETRICS AND GYNECOLOGY | Facility: CLINIC | Age: 48
End: 2018-07-24

## 2018-07-24 ENCOUNTER — ANESTHESIA EVENT (OUTPATIENT)
Dept: SURGERY | Facility: HOSPITAL | Age: 48
DRG: 742 | End: 2018-07-24
Payer: MEDICAID

## 2018-07-24 NOTE — TELEPHONE ENCOUNTER
Pt called, c/o heavy cycle. Pt wants to know if she should still have the surgery tomorrow. Pt informed we would speak with Dr. Childress and contact her with his response.

## 2018-07-25 ENCOUNTER — ANESTHESIA (OUTPATIENT)
Dept: SURGERY | Facility: HOSPITAL | Age: 48
DRG: 742 | End: 2018-07-25
Payer: MEDICAID

## 2018-07-25 ENCOUNTER — SURGERY (OUTPATIENT)
Age: 48
End: 2018-07-25

## 2018-07-25 ENCOUNTER — HOSPITAL ENCOUNTER (INPATIENT)
Facility: HOSPITAL | Age: 48
LOS: 2 days | Discharge: HOME | DRG: 742 | End: 2018-07-27
Attending: OBSTETRICS & GYNECOLOGY | Admitting: OBSTETRICS & GYNECOLOGY
Payer: MEDICAID

## 2018-07-25 DIAGNOSIS — I10 ESSENTIAL HYPERTENSION: ICD-10-CM

## 2018-07-25 DIAGNOSIS — Z90.710 STATUS POST TOTAL ABDOMINAL HYSTERECTOMY: Primary | ICD-10-CM

## 2018-07-25 DIAGNOSIS — D25.9 UTERINE LEIOMYOMA, UNSPECIFIED LOCATION: ICD-10-CM

## 2018-07-25 DIAGNOSIS — E66.01 OBESITY, CLASS III, BMI 40-49.9 (MORBID OBESITY): ICD-10-CM

## 2018-07-25 DIAGNOSIS — E03.9 ACQUIRED HYPOTHYROIDISM: ICD-10-CM

## 2018-07-25 DIAGNOSIS — E11.9 TYPE 2 DIABETES MELLITUS WITHOUT COMPLICATION, WITHOUT LONG-TERM CURRENT USE OF INSULIN: ICD-10-CM

## 2018-07-25 LAB
POCT GLUCOSE: 126 MG/DL (ref 70–110)
POCT GLUCOSE: 96 MG/DL (ref 70–110)

## 2018-07-25 PROCEDURE — 0UT70ZZ RESECTION OF BILATERAL FALLOPIAN TUBES, OPEN APPROACH: ICD-10-PCS | Performed by: OBSTETRICS & GYNECOLOGY

## 2018-07-25 PROCEDURE — 63600175 PHARM REV CODE 636 W HCPCS: Performed by: ANESTHESIOLOGY

## 2018-07-25 PROCEDURE — 37000009 HC ANESTHESIA EA ADD 15 MINS: Performed by: OBSTETRICS & GYNECOLOGY

## 2018-07-25 PROCEDURE — 25000003 PHARM REV CODE 250: Performed by: OBSTETRICS & GYNECOLOGY

## 2018-07-25 PROCEDURE — 63600175 PHARM REV CODE 636 W HCPCS: Performed by: OBSTETRICS & GYNECOLOGY

## 2018-07-25 PROCEDURE — 88307 TISSUE EXAM BY PATHOLOGIST: CPT | Performed by: PATHOLOGY

## 2018-07-25 PROCEDURE — 71000039 HC RECOVERY, EACH ADD'L HOUR: Performed by: OBSTETRICS & GYNECOLOGY

## 2018-07-25 PROCEDURE — 36000709 HC OR TIME LEV III EA ADD 15 MIN: Performed by: OBSTETRICS & GYNECOLOGY

## 2018-07-25 PROCEDURE — 25000003 PHARM REV CODE 250: Performed by: ANESTHESIOLOGY

## 2018-07-25 PROCEDURE — 82962 GLUCOSE BLOOD TEST: CPT | Performed by: OBSTETRICS & GYNECOLOGY

## 2018-07-25 PROCEDURE — 58150 TOTAL HYSTERECTOMY: CPT | Mod: ,,, | Performed by: OBSTETRICS & GYNECOLOGY

## 2018-07-25 PROCEDURE — 0UNG0ZZ RELEASE VAGINA, OPEN APPROACH: ICD-10-PCS | Performed by: OBSTETRICS & GYNECOLOGY

## 2018-07-25 PROCEDURE — 88307 TISSUE EXAM BY PATHOLOGIST: CPT | Mod: 26,,, | Performed by: PATHOLOGY

## 2018-07-25 PROCEDURE — 63600175 PHARM REV CODE 636 W HCPCS: Performed by: NURSE ANESTHETIST, CERTIFIED REGISTERED

## 2018-07-25 PROCEDURE — 36000708 HC OR TIME LEV III 1ST 15 MIN: Performed by: OBSTETRICS & GYNECOLOGY

## 2018-07-25 PROCEDURE — 27201423 OPTIME MED/SURG SUP & DEVICES STERILE SUPPLY: Performed by: OBSTETRICS & GYNECOLOGY

## 2018-07-25 PROCEDURE — 37000008 HC ANESTHESIA 1ST 15 MINUTES: Performed by: OBSTETRICS & GYNECOLOGY

## 2018-07-25 PROCEDURE — D9220A PRA ANESTHESIA: Mod: CRNA,,, | Performed by: NURSE ANESTHETIST, CERTIFIED REGISTERED

## 2018-07-25 PROCEDURE — 58150 TOTAL HYSTERECTOMY: CPT | Mod: 80,,, | Performed by: OBSTETRICS & GYNECOLOGY

## 2018-07-25 PROCEDURE — 71000033 HC RECOVERY, INTIAL HOUR: Performed by: OBSTETRICS & GYNECOLOGY

## 2018-07-25 PROCEDURE — 88342 IMHCHEM/IMCYTCHM 1ST ANTB: CPT | Mod: 26,,, | Performed by: PATHOLOGY

## 2018-07-25 PROCEDURE — S0028 INJECTION, FAMOTIDINE, 20 MG: HCPCS | Performed by: NURSE ANESTHETIST, CERTIFIED REGISTERED

## 2018-07-25 PROCEDURE — 25000003 PHARM REV CODE 250: Performed by: NURSE ANESTHETIST, CERTIFIED REGISTERED

## 2018-07-25 PROCEDURE — 11000001 HC ACUTE MED/SURG PRIVATE ROOM

## 2018-07-25 PROCEDURE — 0UT90ZZ RESECTION OF UTERUS, OPEN APPROACH: ICD-10-PCS | Performed by: OBSTETRICS & GYNECOLOGY

## 2018-07-25 PROCEDURE — 88342 IMHCHEM/IMCYTCHM 1ST ANTB: CPT | Performed by: PATHOLOGY

## 2018-07-25 PROCEDURE — D9220A PRA ANESTHESIA: Mod: ANES,,, | Performed by: ANESTHESIOLOGY

## 2018-07-25 RX ORDER — PROPOFOL 10 MG/ML
VIAL (ML) INTRAVENOUS
Status: DISCONTINUED | OUTPATIENT
Start: 2018-07-25 | End: 2018-07-25

## 2018-07-25 RX ORDER — PANTOPRAZOLE SODIUM 40 MG/1
40 TABLET, DELAYED RELEASE ORAL DAILY
Status: DISCONTINUED | OUTPATIENT
Start: 2018-07-25 | End: 2018-07-27 | Stop reason: HOSPADM

## 2018-07-25 RX ORDER — AMOXICILLIN 250 MG
1 CAPSULE ORAL 2 TIMES DAILY
Status: DISCONTINUED | OUTPATIENT
Start: 2018-07-25 | End: 2018-07-27 | Stop reason: HOSPADM

## 2018-07-25 RX ORDER — PROMETHAZINE HYDROCHLORIDE 25 MG/ML
6.25 INJECTION, SOLUTION INTRAMUSCULAR; INTRAVENOUS ONCE
Status: DISCONTINUED | OUTPATIENT
Start: 2018-07-25 | End: 2018-07-25

## 2018-07-25 RX ORDER — GLYCOPYRROLATE 0.2 MG/ML
INJECTION INTRAMUSCULAR; INTRAVENOUS
Status: DISCONTINUED | OUTPATIENT
Start: 2018-07-25 | End: 2018-07-25

## 2018-07-25 RX ORDER — SODIUM CHLORIDE, SODIUM LACTATE, POTASSIUM CHLORIDE, CALCIUM CHLORIDE 600; 310; 30; 20 MG/100ML; MG/100ML; MG/100ML; MG/100ML
INJECTION, SOLUTION INTRAVENOUS CONTINUOUS
Status: DISCONTINUED | OUTPATIENT
Start: 2018-07-25 | End: 2018-07-25

## 2018-07-25 RX ORDER — SODIUM CHLORIDE 0.9 % (FLUSH) 0.9 %
3 SYRINGE (ML) INJECTION
Status: DISCONTINUED | OUTPATIENT
Start: 2018-07-25 | End: 2018-07-25

## 2018-07-25 RX ORDER — ONDANSETRON 8 MG/1
8 TABLET, ORALLY DISINTEGRATING ORAL EVERY 8 HOURS PRN
Status: DISCONTINUED | OUTPATIENT
Start: 2018-07-25 | End: 2018-07-27 | Stop reason: HOSPADM

## 2018-07-25 RX ORDER — BISACODYL 10 MG
10 SUPPOSITORY, RECTAL RECTAL DAILY PRN
Status: DISCONTINUED | OUTPATIENT
Start: 2018-07-25 | End: 2018-07-27 | Stop reason: HOSPADM

## 2018-07-25 RX ORDER — LOPERAMIDE HYDROCHLORIDE 2 MG/1
2 CAPSULE ORAL CONTINUOUS PRN
Status: DISCONTINUED | OUTPATIENT
Start: 2018-07-25 | End: 2018-07-27 | Stop reason: HOSPADM

## 2018-07-25 RX ORDER — IBUPROFEN 600 MG/1
600 TABLET ORAL EVERY 6 HOURS
Status: DISCONTINUED | OUTPATIENT
Start: 2018-07-26 | End: 2018-07-27 | Stop reason: HOSPADM

## 2018-07-25 RX ORDER — LIDOCAINE HYDROCHLORIDE 10 MG/ML
1 INJECTION, SOLUTION EPIDURAL; INFILTRATION; INTRACAUDAL; PERINEURAL ONCE
Status: DISCONTINUED | OUTPATIENT
Start: 2018-07-25 | End: 2018-07-25 | Stop reason: HOSPADM

## 2018-07-25 RX ORDER — METOCLOPRAMIDE HYDROCHLORIDE 5 MG/ML
INJECTION INTRAMUSCULAR; INTRAVENOUS
Status: DISCONTINUED | OUTPATIENT
Start: 2018-07-25 | End: 2018-07-25

## 2018-07-25 RX ORDER — HYDROMORPHONE HYDROCHLORIDE 2 MG/ML
0.2 INJECTION, SOLUTION INTRAMUSCULAR; INTRAVENOUS; SUBCUTANEOUS EVERY 5 MIN PRN
Status: DISCONTINUED | OUTPATIENT
Start: 2018-07-25 | End: 2018-07-25 | Stop reason: HOSPADM

## 2018-07-25 RX ORDER — DIPHENHYDRAMINE HCL 25 MG
25 CAPSULE ORAL EVERY 4 HOURS PRN
Status: DISCONTINUED | OUTPATIENT
Start: 2018-07-25 | End: 2018-07-27 | Stop reason: HOSPADM

## 2018-07-25 RX ORDER — SODIUM CHLORIDE, SODIUM LACTATE, POTASSIUM CHLORIDE, CALCIUM CHLORIDE 600; 310; 30; 20 MG/100ML; MG/100ML; MG/100ML; MG/100ML
INJECTION, SOLUTION INTRAVENOUS CONTINUOUS
Status: DISCONTINUED | OUTPATIENT
Start: 2018-07-25 | End: 2018-07-27 | Stop reason: HOSPADM

## 2018-07-25 RX ORDER — NALOXONE HCL 0.4 MG/ML
0.02 VIAL (ML) INJECTION
Status: DISCONTINUED | OUTPATIENT
Start: 2018-07-25 | End: 2018-07-27 | Stop reason: HOSPADM

## 2018-07-25 RX ORDER — SODIUM CHLORIDE 0.9 % (FLUSH) 0.9 %
3 SYRINGE (ML) INJECTION
Status: DISCONTINUED | OUTPATIENT
Start: 2018-07-25 | End: 2018-07-27 | Stop reason: HOSPADM

## 2018-07-25 RX ORDER — MIDAZOLAM HYDROCHLORIDE 1 MG/ML
INJECTION, SOLUTION INTRAMUSCULAR; INTRAVENOUS
Status: DISCONTINUED | OUTPATIENT
Start: 2018-07-25 | End: 2018-07-25

## 2018-07-25 RX ORDER — KETOROLAC TROMETHAMINE 30 MG/ML
INJECTION, SOLUTION INTRAMUSCULAR; INTRAVENOUS
Status: DISCONTINUED | OUTPATIENT
Start: 2018-07-25 | End: 2018-07-25

## 2018-07-25 RX ORDER — FAMOTIDINE 10 MG/ML
INJECTION INTRAVENOUS
Status: DISCONTINUED | OUTPATIENT
Start: 2018-07-25 | End: 2018-07-25

## 2018-07-25 RX ORDER — SODIUM CHLORIDE, SODIUM LACTATE, POTASSIUM CHLORIDE, CALCIUM CHLORIDE 600; 310; 30; 20 MG/100ML; MG/100ML; MG/100ML; MG/100ML
1000 INJECTION, SOLUTION INTRAVENOUS ONCE
Status: DISCONTINUED | OUTPATIENT
Start: 2018-07-25 | End: 2018-07-25 | Stop reason: HOSPADM

## 2018-07-25 RX ORDER — LEVOTHYROXINE SODIUM 25 UG/1
100 TABLET ORAL
Status: DISCONTINUED | OUTPATIENT
Start: 2018-07-26 | End: 2018-07-27 | Stop reason: HOSPADM

## 2018-07-25 RX ORDER — OXYCODONE AND ACETAMINOPHEN 10; 325 MG/1; MG/1
1 TABLET ORAL EVERY 4 HOURS PRN
Status: DISCONTINUED | OUTPATIENT
Start: 2018-07-25 | End: 2018-07-27 | Stop reason: HOSPADM

## 2018-07-25 RX ORDER — MORPHINE SULFATE 4 MG/ML
2 INJECTION, SOLUTION INTRAMUSCULAR; INTRAVENOUS EVERY 5 MIN PRN
Status: DISCONTINUED | OUTPATIENT
Start: 2018-07-25 | End: 2018-07-25 | Stop reason: HOSPADM

## 2018-07-25 RX ORDER — KETOROLAC TROMETHAMINE 30 MG/ML
30 INJECTION, SOLUTION INTRAMUSCULAR; INTRAVENOUS EVERY 6 HOURS
Status: DISPENSED | OUTPATIENT
Start: 2018-07-25 | End: 2018-07-26

## 2018-07-25 RX ORDER — NEOSTIGMINE METHYLSULFATE 1 MG/ML
INJECTION, SOLUTION INTRAVENOUS
Status: DISCONTINUED | OUTPATIENT
Start: 2018-07-25 | End: 2018-07-25

## 2018-07-25 RX ORDER — MORPHINE SULFATE 1 MG/ML
INJECTION INTRAVENOUS CONTINUOUS
Status: DISCONTINUED | OUTPATIENT
Start: 2018-07-25 | End: 2018-07-27 | Stop reason: HOSPADM

## 2018-07-25 RX ORDER — MUPIROCIN 20 MG/G
1 OINTMENT TOPICAL 2 TIMES DAILY
Status: DISCONTINUED | OUTPATIENT
Start: 2018-07-25 | End: 2018-07-27 | Stop reason: HOSPADM

## 2018-07-25 RX ORDER — LIDOCAINE HCL/PF 100 MG/5ML
SYRINGE (ML) INTRAVENOUS
Status: DISCONTINUED | OUTPATIENT
Start: 2018-07-25 | End: 2018-07-25

## 2018-07-25 RX ORDER — OXYCODONE AND ACETAMINOPHEN 5; 325 MG/1; MG/1
1 TABLET ORAL EVERY 4 HOURS PRN
Status: DISCONTINUED | OUTPATIENT
Start: 2018-07-25 | End: 2018-07-27 | Stop reason: HOSPADM

## 2018-07-25 RX ORDER — ROCURONIUM BROMIDE 10 MG/ML
INJECTION, SOLUTION INTRAVENOUS
Status: DISCONTINUED | OUTPATIENT
Start: 2018-07-25 | End: 2018-07-25

## 2018-07-25 RX ORDER — ONDANSETRON 2 MG/ML
INJECTION INTRAMUSCULAR; INTRAVENOUS
Status: DISCONTINUED | OUTPATIENT
Start: 2018-07-25 | End: 2018-07-25

## 2018-07-25 RX ORDER — FENTANYL CITRATE 50 UG/ML
INJECTION, SOLUTION INTRAMUSCULAR; INTRAVENOUS
Status: DISCONTINUED | OUTPATIENT
Start: 2018-07-25 | End: 2018-07-25

## 2018-07-25 RX ORDER — SUCCINYLCHOLINE CHLORIDE 20 MG/ML
INJECTION INTRAMUSCULAR; INTRAVENOUS
Status: DISCONTINUED | OUTPATIENT
Start: 2018-07-25 | End: 2018-07-25

## 2018-07-25 RX ADMIN — ROCURONIUM BROMIDE 10 MG: 10 INJECTION, SOLUTION INTRAVENOUS at 10:07

## 2018-07-25 RX ADMIN — FENTANYL CITRATE 50 MCG: 50 INJECTION INTRAMUSCULAR; INTRAVENOUS at 09:07

## 2018-07-25 RX ADMIN — ROCURONIUM BROMIDE 30 MG: 10 INJECTION, SOLUTION INTRAVENOUS at 09:07

## 2018-07-25 RX ADMIN — GLYCOPYRROLATE 0.5 MG: 0.2 INJECTION, SOLUTION INTRAMUSCULAR; INTRAVENOUS at 11:07

## 2018-07-25 RX ADMIN — HYDROMORPHONE HYDROCHLORIDE 0.2 MG: 2 INJECTION, SOLUTION INTRAMUSCULAR; INTRAVENOUS; SUBCUTANEOUS at 11:07

## 2018-07-25 RX ADMIN — FENTANYL CITRATE 100 MCG: 50 INJECTION INTRAMUSCULAR; INTRAVENOUS at 09:07

## 2018-07-25 RX ADMIN — GLYCOPYRROLATE 0.2 MG: 0.2 INJECTION, SOLUTION INTRAMUSCULAR; INTRAVENOUS at 09:07

## 2018-07-25 RX ADMIN — NEOSTIGMINE METHYLSULFATE 5 MG: 1 INJECTION INTRAVENOUS at 11:07

## 2018-07-25 RX ADMIN — KETOROLAC TROMETHAMINE 30 MG: 30 INJECTION, SOLUTION INTRAMUSCULAR at 11:07

## 2018-07-25 RX ADMIN — SODIUM CHLORIDE, SODIUM LACTATE, POTASSIUM CHLORIDE, AND CALCIUM CHLORIDE: .6; .31; .03; .02 INJECTION, SOLUTION INTRAVENOUS at 02:07

## 2018-07-25 RX ADMIN — SODIUM CHLORIDE, SODIUM LACTATE, POTASSIUM CHLORIDE, AND CALCIUM CHLORIDE: .6; .31; .03; .02 INJECTION, SOLUTION INTRAVENOUS at 08:07

## 2018-07-25 RX ADMIN — MIDAZOLAM HYDROCHLORIDE 2 MG: 1 INJECTION, SOLUTION INTRAMUSCULAR; INTRAVENOUS at 09:07

## 2018-07-25 RX ADMIN — METOCLOPRAMIDE 10 MG: 5 INJECTION, SOLUTION INTRAMUSCULAR; INTRAVENOUS at 09:07

## 2018-07-25 RX ADMIN — SODIUM CHLORIDE, SODIUM LACTATE, POTASSIUM CHLORIDE, AND CALCIUM CHLORIDE: .6; .31; .03; .02 INJECTION, SOLUTION INTRAVENOUS at 09:07

## 2018-07-25 RX ADMIN — HYDROMORPHONE HYDROCHLORIDE 0.2 MG: 2 INJECTION, SOLUTION INTRAMUSCULAR; INTRAVENOUS; SUBCUTANEOUS at 12:07

## 2018-07-25 RX ADMIN — FENTANYL CITRATE 50 MCG: 50 INJECTION INTRAMUSCULAR; INTRAVENOUS at 10:07

## 2018-07-25 RX ADMIN — FAMOTIDINE 20 MG: 10 INJECTION, SOLUTION INTRAVENOUS at 09:07

## 2018-07-25 RX ADMIN — ONDANSETRON 4 MG: 2 INJECTION, SOLUTION INTRAMUSCULAR; INTRAVENOUS at 10:07

## 2018-07-25 RX ADMIN — SUCCINYLCHOLINE CHLORIDE 140 MG: 20 INJECTION, SOLUTION INTRAMUSCULAR; INTRAVENOUS at 09:07

## 2018-07-25 RX ADMIN — MORPHINE SULFATE: 1 INJECTION INTRAVENOUS at 12:07

## 2018-07-25 RX ADMIN — LIDOCAINE HYDROCHLORIDE 100 MG: 20 INJECTION, SOLUTION INTRAVENOUS at 09:07

## 2018-07-25 RX ADMIN — PROMETHAZINE HYDROCHLORIDE 6.25 MG: 25 INJECTION INTRAMUSCULAR; INTRAVENOUS at 11:07

## 2018-07-25 RX ADMIN — KETOROLAC TROMETHAMINE 30 MG: 30 INJECTION, SOLUTION INTRAMUSCULAR; INTRAVENOUS at 11:07

## 2018-07-25 RX ADMIN — MUPIROCIN 1 G: 20 OINTMENT TOPICAL at 09:07

## 2018-07-25 RX ADMIN — MORPHINE SULFATE: 1 INJECTION INTRAVENOUS at 05:07

## 2018-07-25 RX ADMIN — KETOROLAC TROMETHAMINE 30 MG: 30 INJECTION, SOLUTION INTRAMUSCULAR at 05:07

## 2018-07-25 RX ADMIN — DOCUSATE SODIUM AND SENNOSIDES 1 TABLET: 8.6; 5 TABLET, FILM COATED ORAL at 09:07

## 2018-07-25 RX ADMIN — PROPOFOL 200 MG: 10 INJECTION, EMULSION INTRAVENOUS at 09:07

## 2018-07-25 NOTE — ANESTHESIA POSTPROCEDURE EVALUATION
"Anesthesia Post Evaluation    Patient: Nicolle Kirk    Procedure(s) Performed: Procedure(s) (LRB):  HYSTERECTOMY, TOTAL, ABDOMINAL (N/A)    Final Anesthesia Type: general  Patient location during evaluation: PACU  Patient participation: Yes- Able to Participate  Level of consciousness: awake and alert, oriented and awake  Post-procedure vital signs: reviewed and stable  Airway patency: patent  PONV status at discharge: No PONV  Anesthetic complications: no      Cardiovascular status: blood pressure returned to baseline  Respiratory status: unassisted, spontaneous ventilation and room air  Hydration status: euvolemic  Follow-up not needed.        Visit Vitals  BP (!) 168/72   Pulse 62   Temp 36.9 °C (98.5 °F) (Tympanic)   Resp (!) 22   Ht 5' 3" (1.6 m)   Wt 112.2 kg (247 lb 5 oz)   LMP 07/18/2018 (Exact Date)   SpO2 100%   Breastfeeding? No   BMI 43.81 kg/m²       Pain/Abran Score: Pain Assessment Performed: Yes (7/25/2018 11:10 AM)  Presence of Pain: complains of pain/discomfort (7/25/2018 11:10 AM)  Pain Rating Prior to Med Admin: 6 (7/25/2018 12:26 PM)  Pain Rating Post Med Admin: 6 (7/25/2018 12:35 PM)  Abran Score: 8 (7/25/2018 11:10 AM)      "

## 2018-07-25 NOTE — BRIEF OP NOTE
OPERATIVE NOTES     Date of Procedure: 2018    Preoperative Diagnoses:  1.  Symptomatic Uterine Fibroids  2.  Previous  sections with tubal ligation  3.  Obesity    Postoperative Diagnoses:  1.  Symptomatic Uterine Fibroids  2.  Previous  sections with tubal ligation  3.  Obesity    Procedures:  1.  Total Abdominal Hysterectomy  2.  Bilateral Salpingectomy  3.  Adhesiolysis    Surgeon: MD Monroe  Assistant: MD Zayra    Anesthesia: GETA   EBL: 75 cc    Findings:  1.  Enlarged uterus  2.  Extensive adhesions from anterior abdominal wall to uterus, from omentum to uterus  3.  Multiple ovarian cysts    Complications:  None    Specimen:  1. Uterus  2. Cervix  3.  Bilateral segments of tube    History:   48 yo  with long history of symptomatic uterine fibroids with menorrhagia and dysmenorrhea     Pelvic ultrasound on 3/23/2018 shows  There is a lobulated uterus that measures approximately 13.5 x 10.4 x 8.7 cm with significant distortion of the endometrial stripe.  There is a large heterogeneous uterine fibroid along the posterior body of the uterus measuring approximately 8.1 x 8.4 x 6.7 cm.  The large uterine fibroid displaces and distorts the endometrial stripe.  In the anterior lower uterine segment there is a small fibroid that measures 2 x 3 x 2.4 cm.  The right ovary measures 3.8 x 3.2 x 2.7 cm.  Multiple follicles or a large septated cyst measures approximately 3.2 x 2.5 x 2 cm.  No abnormal fluid collections in the adnexa.  The left ovary measures 3.3 x 2.85 cm with normal vascular flow.     Previous  sections x 3 with tubal ligation     Severe obesity, hypothyroidism, type 2 Diabetes Mellitus, chronic hypertension, anemia.  Had surgical clearance from her PCP, Dr Brandon Sequeira.     Here for TAHBSx  Again, procedures explained  Questions answered  Consents signed  She is nervous but eager to proceed    Michael Childress MD

## 2018-07-25 NOTE — OP NOTE
OPERATIVE NOTES      Date of Procedure: 2018     Preoperative Diagnoses:  1.  Symptomatic Uterine Fibroids  2.  Previous  sections with tubal ligation  3.  Obesity     Postoperative Diagnoses:  1.  Symptomatic Uterine Fibroids  2.  Previous  sections with tubal ligation  3.  Obesity     Procedures:  1.  Total Abdominal Hysterectomy  2.  Bilateral Salpingectomy  3.  Adhesiolysis     Surgeon: MD Monroe  Assistant: MD Zayra     Anesthesia: GETA   EBL: 75 cc     Findings:  1.  Enlarged uterus  2.  Extensive adhesions from anterior abdominal wall to uterus, from omentum to uterus  3.  Multiple ovarian cysts     Complications:  None     Specimen:  1. Uterus  2. Cervix  3.  Bilateral segments of tube     History:   46 yo  with long history of symptomatic uterine fibroids with menorrhagia and dysmenorrhea     Pelvic ultrasound on 3/23/2018 shows  There is a lobulated uterus that measures approximately 13.5 x 10.4 x 8.7 cm with significant distortion of the endometrial stripe.  There is a large heterogeneous uterine fibroid along the posterior body of the uterus measuring approximately 8.1 x 8.4 x 6.7 cm.  The large uterine fibroid displaces and distorts the endometrial stripe.  In the anterior lower uterine segment there is a small fibroid that measures 2 x 3 x 2.4 cm.  The right ovary measures 3.8 x 3.2 x 2.7 cm.  Multiple follicles or a large septated cyst measures approximately 3.2 x 2.5 x 2 cm.  No abnormal fluid collections in the adnexa.  The left ovary measures 3.3 x 2.85 cm with normal vascular flow.     Previous  sections x 3 with tubal ligation     Severe obesity, hypothyroidism, type 2 Diabetes Mellitus, chronic hypertension, anemia.  Had surgical clearance from her PCP, Dr Brandon Sequeira.     Here for TAHBSx  Again, procedures explained  Questions answered  Consents signed  She is nervous but eager to proceed    PROCEDURE IN DETAILS    After confirming appropriate consents  were signed and in chart, patient was transferred to the operating room.  General anesthesia per Anesthesia with rapid sequence induction and atraumatic intubation.    Pelvic exam revealed enlarged uterus  at 16-18 week in size. She was prepped, church catheter was placed. She was draped in sterile fashion in the supine position.   Pfannenstiel incision was made. This was taken down the to fascia. The fascia was incised with a scalpel. The muscles were dissected off the fascia. The muscles were then  in the midline. The peritoneum was identified and grasped with hemostats. It was opened with the metzenbaum scissors.   Significant adhesions encountered as described above.  Some were lysed with a combination of the Bovie and Metzenbaum scissors before we could access the uterus.    The uterus was lifted out of the pelvis. The round ligament on the right was grasped with Ligasure device cauterized and transected. The uteroovarian ligament was identified. The uteroovarian ligament was clamped with Ligasure device cauterized and transected. The uterine artery on the right was skeletonized. It was then clamped with EnSeal device cauterized and transected. Attention was turned to the left.  The round ligament was grasped with ligasure device cauterized and transected.  The uteroovarian ligament was identified. The uteroovarian ligament was clamped with EnSeal device cauterized and transected.The uterine artery on the left was then skeletonized.  It was then clamped with Ligasure device cauterized and transected. The bladder was dissected off the cervix. The uterus was amputated. The cervix was grasped with [facility] clamp. At this time the O'Ramón- O'Romeo retractor was placed into the abdomen and the bowel was packed with four moist laparotomy sponges. The cardinal ligament was serial clamped, cut, and tied with 0-vicryl. Once to the internal os, curved Roberto clamp was used to come across the cervix. Renee  scissors were used to liberate the cervix from the vagina. The vagina was closed with 0-vicryl stitches. Attention was turned back to the adnexa. The fallopian tubes were grasped with [place] clamps, isolated, clamped with Ligasure device cauterized and transected. The abdomen was copiously irrigated. Hemostasis was noted.     Ovarian cysts were then drained with the Bovie.    Four laparotomy sponges were removed from the abdomen. The O'Ramón- O'Romeo retractor was removed. Peritoneum closed along the midline with 3.0 Vicryl.  The fascia was closed with two 1-Vicryl stitches. The subcutaneous tissue was irrigated copiously. The subcutaneous dead space was closed with 3-0 vicryl.  The skin was closed in with 3.0 Vicryl on a Hansel needle and AquaCel dressing applied.   All sponge and needle counts were correct times two.

## 2018-07-25 NOTE — ANESTHESIA PREPROCEDURE EVALUATION
07/25/2018  Nicolle Kirk is a 47 y.o., female.    Anesthesia Evaluation    I have reviewed the Patient Summary Reports.    I have reviewed the Nursing Notes.   I have reviewed the Medications.     Review of Systems  Anesthesia Hx:  No problems with previous Anesthesia Denies Hx of Anesthetic complications  Neg history of prior surgery. Denies Family Hx of Anesthesia complications.   Denies Personal Hx of Anesthesia complications.   Social:  Non-Smoker, No Alcohol Use    Hematology/Oncology:  Hematology Normal   Oncology Normal     EENT/Dental:EENT/Dental Normal   Cardiovascular:   Exercise tolerance: good Hypertension    Pulmonary:  Pulmonary Normal    Renal/:  Renal/ Normal     Hepatic/GI:  Hepatic/GI Normal    Musculoskeletal:  Musculoskeletal Normal    Neurological:  Neurology Normal    Endocrine:   Diabetes, type 2    Dermatological:  Skin Normal    Psych:  Psychiatric Normal           Physical Exam  General:  Well nourished    Airway/Jaw/Neck:  Airway Findings: Mouth Opening: Normal General Airway Assessment: Adult  Mallampati: II  TM Distance: Normal, at least 6 cm         Dental:  DENTAL FINDINGS: Normal   Chest/Lungs:  Chest/Lungs Clear    Heart/Vascular:  Heart Findings: Normal Heart murmur: negative       Mental Status:  Mental Status Findings:  Cooperative, Alert and Oriented         Anesthesia Plan  Type of Anesthesia, risks & benefits discussed:  Anesthesia Type:  spinal, general  Patient's Preference:   Intra-op Monitoring Plan:   Intra-op Monitoring Plan Comments:   Post Op Pain Control Plan:   Post Op Pain Control Plan Comments:   Induction:   IV  Beta Blocker:  Patient is not currently on a Beta-Blocker (No further documentation required).       Informed Consent: Patient understands risks and agrees with Anesthesia plan.  Questions answered. Anesthesia consent signed with  patient.  ASA Score: 2     Day of Surgery Review of History & Physical:        Anesthesia Plan Notes: npo        Ready For Surgery From Anesthesia Perspective.

## 2018-07-25 NOTE — INTERVAL H&P NOTE
The patient has been examined and the H&P has been reviewed:    I concur with the findings and no changes have occurred since H&P was written.    Anesthesia/Surgery risks, benefits and alternative options discussed and understood by patient/family.          Active Hospital Problems    Diagnosis  POA    Uterine leiomyoma [D25.9]  Yes      Resolved Hospital Problems    Diagnosis Date Resolved POA   No resolved problems to display.     46 yo  with long history of symptomatic uterine fibroids with menorrhagia and dysmenorrhea     Pelvic ultrasound on 3/23/2018 shows  There is a lobulated uterus that measures approximately 13.5 x 10.4 x 8.7 cm with significant distortion of the endometrial stripe.  There is a large heterogeneous uterine fibroid along the posterior body of the uterus measuring approximately 8.1 x 8.4 x 6.7 cm.  The large uterine fibroid displaces and distorts the endometrial stripe.  In the anterior lower uterine segment there is a small fibroid that measures 2 x 3 x 2.4 cm.  The right ovary measures 3.8 x 3.2 x 2.7 cm.  Multiple follicles or a large septated cyst measures approximately 3.2 x 2.5 x 2 cm.  No abnormal fluid collections in the adnexa.  The left ovary measures 3.3 x 2.85 cm with normal vascular flow.     Previous  sections x 3 with tubal ligation     Severe obesity, hypothyroidism, type 2 Diabetes Mellitus, chronic hypertension, anemia.  Had surgical clearance from her PCP, Dr Brandon Sequeira.    Here for TAHBSx  Again, procedures explained  Questions answered  Consents signed  She is nervous but eager to proceed    Michael Childress MD

## 2018-07-26 LAB
BASOPHILS # BLD AUTO: 0.01 K/UL
BASOPHILS NFR BLD: 0.1 %
DIFFERENTIAL METHOD: ABNORMAL
EOSINOPHIL # BLD AUTO: 0.1 K/UL
EOSINOPHIL NFR BLD: 1.5 %
ERYTHROCYTE [DISTWIDTH] IN BLOOD BY AUTOMATED COUNT: 16.9 %
HCT VFR BLD AUTO: 33.4 %
HGB BLD-MCNC: 10.7 G/DL
LYMPHOCYTES # BLD AUTO: 1.7 K/UL
LYMPHOCYTES NFR BLD: 18.6 %
MCH RBC QN AUTO: 26.8 PG
MCHC RBC AUTO-ENTMCNC: 32 G/DL
MCV RBC AUTO: 84 FL
MONOCYTES # BLD AUTO: 1 K/UL
MONOCYTES NFR BLD: 10.6 %
NEUTROPHILS # BLD AUTO: 6.4 K/UL
NEUTROPHILS NFR BLD: 69.1 %
PLATELET # BLD AUTO: 298 K/UL
PMV BLD AUTO: 9.9 FL
RBC # BLD AUTO: 4 M/UL
WBC # BLD AUTO: 9.26 K/UL

## 2018-07-26 PROCEDURE — 11000001 HC ACUTE MED/SURG PRIVATE ROOM

## 2018-07-26 PROCEDURE — 36415 COLL VENOUS BLD VENIPUNCTURE: CPT

## 2018-07-26 PROCEDURE — 25000003 PHARM REV CODE 250: Performed by: OBSTETRICS & GYNECOLOGY

## 2018-07-26 PROCEDURE — 85025 COMPLETE CBC W/AUTO DIFF WBC: CPT

## 2018-07-26 PROCEDURE — 63600175 PHARM REV CODE 636 W HCPCS: Performed by: OBSTETRICS & GYNECOLOGY

## 2018-07-26 RX ADMIN — OXYCODONE HYDROCHLORIDE AND ACETAMINOPHEN 1 TABLET: 5; 325 TABLET ORAL at 04:07

## 2018-07-26 RX ADMIN — IBUPROFEN 600 MG: 600 TABLET ORAL at 12:07

## 2018-07-26 RX ADMIN — PANTOPRAZOLE SODIUM 40 MG: 40 TABLET, DELAYED RELEASE ORAL at 08:07

## 2018-07-26 RX ADMIN — KETOROLAC TROMETHAMINE 30 MG: 30 INJECTION, SOLUTION INTRAMUSCULAR at 05:07

## 2018-07-26 RX ADMIN — IBUPROFEN 600 MG: 600 TABLET ORAL at 11:07

## 2018-07-26 RX ADMIN — MUPIROCIN 1 G: 20 OINTMENT TOPICAL at 08:07

## 2018-07-26 RX ADMIN — LEVOTHYROXINE SODIUM 100 MCG: 25 TABLET ORAL at 05:07

## 2018-07-26 RX ADMIN — OXYCODONE HYDROCHLORIDE AND ACETAMINOPHEN 1 TABLET: 5; 325 TABLET ORAL at 01:07

## 2018-07-26 RX ADMIN — OXYCODONE HYDROCHLORIDE AND ACETAMINOPHEN 1 TABLET: 10; 325 TABLET ORAL at 09:07

## 2018-07-26 RX ADMIN — DOCUSATE SODIUM AND SENNOSIDES 1 TABLET: 8.6; 5 TABLET, FILM COATED ORAL at 08:07

## 2018-07-26 RX ADMIN — OXYCODONE HYDROCHLORIDE AND ACETAMINOPHEN 1 TABLET: 5; 325 TABLET ORAL at 05:07

## 2018-07-26 RX ADMIN — ONDANSETRON 8 MG: 8 TABLET, ORALLY DISINTEGRATING ORAL at 10:07

## 2018-07-26 RX ADMIN — OXYCODONE HYDROCHLORIDE AND ACETAMINOPHEN 1 TABLET: 5; 325 TABLET ORAL at 11:07

## 2018-07-26 RX ADMIN — IBUPROFEN 600 MG: 600 TABLET ORAL at 05:07

## 2018-07-26 NOTE — PLAN OF CARE
Problem: Patient Care Overview  Goal: Plan of Care Review  Outcome: Ongoing (interventions implemented as appropriate)  POC discussed. White board updated with [employer] number. Bed in LL position, SR up x 2, call light and white phone within reach. Patient instructed to call with questions or for assistance. Understanding voiced. KELSEY.

## 2018-07-26 NOTE — SUBJECTIVE & OBJECTIVE
Interval History:   Status post total abdominal hysterectomy with bilateral salpingectomy yesterday, 7/25/2018    Scheduled Meds:   ibuprofen  600 mg Oral Q6H    ketorolac  30 mg Intravenous Q6H    levothyroxine  100 mcg Oral Before breakfast    mupirocin  1 g Nasal BID    pantoprazole  40 mg Oral Daily    senna-docusate 8.6-50 mg  1 tablet Oral BID     Continuous Infusions:   lactated ringers 125 mL/hr at 07/25/18 1434    loperamide      morphine       PRN Meds:bisacodyl, diphenhydrAMINE, loperamide, naloxone, ondansetron, oxyCODONE-acetaminophen, oxyCODONE-acetaminophen, promethazine (PHENERGAN) IVPB, sodium chloride 0.9%    Review of patient's allergies indicates:  No Known Allergies    Objective:     Vital Signs (Most Recent):  Temp: 98.9 °F (37.2 °C) (07/26/18 0701)  Pulse: 73 (07/26/18 0701)  Resp: 17 (07/26/18 0701)  BP: 120/63 (07/26/18 0701)  SpO2: 98 % (07/25/18 2011) Vital Signs (24h Range):  Temp:  [96.2 °F (35.7 °C)-98.9 °F (37.2 °C)] 98.9 °F (37.2 °C)  Pulse:  [56-74] 73  Resp:  [14-22] 17  SpO2:  [96 %-100 %] 98 %  BP: ()/(54-89) 120/63     Weight: 112.2 kg (247 lb 5 oz)  Body mass index is 43.81 kg/m².  Patient's last menstrual period was 07/18/2018 (exact date).    I&O (Last 24H):    Intake/Output Summary (Last 24 hours) at 07/26/18 0849  Last data filed at 07/26/18 0800   Gross per 24 hour   Intake             3852 ml   Output             1460 ml   Net             2392 ml       Physical Exam:   Constitutional: She appears well-developed and well-nourished. No distress.    HENT:   Head: Normocephalic and atraumatic.    Eyes: EOM are normal.    Neck: Normal range of motion.    Cardiovascular: Normal rate.     Pulmonary/Chest: Effort normal. No respiratory distress.        Abdominal: Soft. She exhibits no distension. There is no tenderness. There is no rebound and no guarding.   Pfannenstiel incision in AquaCel dressing.  No evidence of active bleeding.                 Musculoskeletal:  Normal range of motion.       Neurological: She is alert.    Skin: Skin is warm and dry.    Psychiatric: She has a normal mood and affect.       Laboratory:  CBC:   Recent Labs  Lab 07/26/18  0655   WBC 9.26   RBC 4.00   HGB 10.7*   HCT 33.4*      MCV 84   MCH 26.8*   MCHC 32.0     I have personallly reviewed all pertinent lab results from the last 24 hours.

## 2018-07-26 NOTE — PROGRESS NOTES
[facility]  Obstetrics & Gynecology  Progress Note    Patient Name: Nicolle Kirk  MRN: [id number]  Admission Date: 2018  Primary Care Provider: Savita Henson MD  Principal Problem: Status post total abdominal hysterectomy    Subjective:     HPI:  46 yo  with long history of symptomatic uterine fibroids with menorrhagia and dysmenorrhea     Pelvic ultrasound on 3/23/2018 shows  There is a lobulated uterus that measures approximately 13.5 x 10.4 x 8.7 cm with significant distortion of the endometrial stripe.  There is a large heterogeneous uterine fibroid along the posterior body of the uterus measuring approximately 8.1 x 8.4 x 6.7 cm.  The large uterine fibroid displaces and distorts the endometrial stripe.  In the anterior lower uterine segment there is a small fibroid that measures 2 x 3 x 2.4 cm.  The right ovary measures 3.8 x 3.2 x 2.7 cm.  Multiple follicles or a large septated cyst measures approximately 3.2 x 2.5 x 2 cm.  No abnormal fluid collections in the adnexa.  The left ovary measures 3.3 x 2.85 cm with normal vascular flow.     Previous  sections x 3 with tubal ligation     Severe obesity, hypothyroidism, type 2 Diabetes Mellitus, chronic hypertension, anemia.  Had surgical clearance from her PCP, Dr Brandon Sequeira.     Here for TAHBSx  Again, procedures explained  Questions answered  Consents signed  She is nervous but eager to proceed    Interval History:   Status post total abdominal hysterectomy with bilateral salpingectomy yesterday, 2018    Scheduled Meds:   ibuprofen  600 mg Oral Q6H    ketorolac  30 mg Intravenous Q6H    levothyroxine  100 mcg Oral Before breakfast    mupirocin  1 g Nasal BID    pantoprazole  40 mg Oral Daily    senna-docusate 8.6-50 mg  1 tablet Oral BID     Continuous Infusions:   lactated ringers 125 mL/hr at 18 1434    loperamide      morphine       PRN Meds:bisacodyl, diphenhydrAMINE, loperamide,  naloxone, ondansetron, oxyCODONE-acetaminophen, oxyCODONE-acetaminophen, promethazine (PHENERGAN) IVPB, sodium chloride 0.9%    Review of patient's allergies indicates:  No Known Allergies    Objective:     Vital Signs (Most Recent):  Temp: 98.9 °F (37.2 °C) (07/26/18 0701)  Pulse: 73 (07/26/18 0701)  Resp: 17 (07/26/18 0701)  BP: 120/63 (07/26/18 0701)  SpO2: 98 % (07/25/18 2011) Vital Signs (24h Range):  Temp:  [96.2 °F (35.7 °C)-98.9 °F (37.2 °C)] 98.9 °F (37.2 °C)  Pulse:  [56-74] 73  Resp:  [14-22] 17  SpO2:  [96 %-100 %] 98 %  BP: ()/(54-89) 120/63     Weight: 112.2 kg (247 lb 5 oz)  Body mass index is 43.81 kg/m².  Patient's last menstrual period was 07/18/2018 (exact date).    I&O (Last 24H):    Intake/Output Summary (Last 24 hours) at 07/26/18 0849  Last data filed at 07/26/18 0800   Gross per 24 hour   Intake             3852 ml   Output             1460 ml   Net             2392 ml       Physical Exam:   Constitutional: She appears well-developed and well-nourished. No distress.    HENT:   Head: Normocephalic and atraumatic.    Eyes: EOM are normal.    Neck: Normal range of motion.    Cardiovascular: Normal rate.     Pulmonary/Chest: Effort normal. No respiratory distress.        Abdominal: Soft. She exhibits no distension. There is no tenderness. There is no rebound and no guarding.   Pfannenstiel incision in AquaCel dressing.  No evidence of active bleeding.                 Musculoskeletal: Normal range of motion.       Neurological: She is alert.    Skin: Skin is warm and dry.    Psychiatric: She has a normal mood and affect.       Laboratory:  CBC:   Recent Labs  Lab 07/26/18  0655   WBC 9.26   RBC 4.00   HGB 10.7*   HCT 33.4*      MCV 84   MCH 26.8*   MCHC 32.0     I have personallly reviewed all pertinent lab results from the last 24 hours.      Assessment/Plan:     * Status post total abdominal hysterectomy    Routine postoperative care  Watch vaginal bleeding  Pain control  Discharge  home tomorrow, 7/27/2018              Michael Childress MD  Obstetrics & Gynecology  [facility]

## 2018-07-26 NOTE — HOSPITAL COURSE
TAHBSx performed on 7/25/2018 without any difficulty    Postoperative course was benign.  She is tolerating oral intake well.  Exam was benign with patient afebrile, vitals stable, and minimal bleeding.  Normal activities.  Patient discharged home on postoperative day #2, 7/27/2018   Discharge medications include Percocet, Motrin.  Follow-up with me next week for dressing removal    Michael Childress MD.

## 2018-07-26 NOTE — ASSESSMENT & PLAN NOTE
Routine postoperative care  Watch vaginal bleeding  Pain control  Discharge home tomorrow, 7/27/2018

## 2018-07-26 NOTE — HPI
46 yo  with long history of symptomatic uterine fibroids with menorrhagia and dysmenorrhea     Pelvic ultrasound on 3/23/2018 shows  There is a lobulated uterus that measures approximately 13.5 x 10.4 x 8.7 cm with significant distortion of the endometrial stripe.  There is a large heterogeneous uterine fibroid along the posterior body of the uterus measuring approximately 8.1 x 8.4 x 6.7 cm.  The large uterine fibroid displaces and distorts the endometrial stripe.  In the anterior lower uterine segment there is a small fibroid that measures 2 x 3 x 2.4 cm.  The right ovary measures 3.8 x 3.2 x 2.7 cm.  Multiple follicles or a large septated cyst measures approximately 3.2 x 2.5 x 2 cm.  No abnormal fluid collections in the adnexa.  The left ovary measures 3.3 x 2.85 cm with normal vascular flow.     Previous  sections x 3 with tubal ligation     Severe obesity, hypothyroidism, type 2 Diabetes Mellitus, chronic hypertension, anemia.  Had surgical clearance from her PCP, Dr Brandon Sequeira.     Here for TAHBSx  Again, procedures explained  Questions answered  Consents signed  She is nervous but eager to proceed

## 2018-07-26 NOTE — PROGRESS NOTES
Up to void with standby assist x 2. Steady gait. Voided without issue. Ambulated back to bed. NADN. Tolerated well.

## 2018-07-27 VITALS
HEIGHT: 63 IN | RESPIRATION RATE: 19 BRPM | HEART RATE: 80 BPM | DIASTOLIC BLOOD PRESSURE: 71 MMHG | TEMPERATURE: 98 F | WEIGHT: 247.31 LBS | SYSTOLIC BLOOD PRESSURE: 135 MMHG | OXYGEN SATURATION: 98 % | BODY MASS INDEX: 43.82 KG/M2

## 2018-07-27 PROCEDURE — 25000003 PHARM REV CODE 250: Performed by: OBSTETRICS & GYNECOLOGY

## 2018-07-27 RX ORDER — OXYCODONE AND ACETAMINOPHEN 5; 325 MG/1; MG/1
1 TABLET ORAL EVERY 4 HOURS PRN
Qty: 20 TABLET | Refills: 0 | Status: SHIPPED | OUTPATIENT
Start: 2018-07-27 | End: 2018-08-01 | Stop reason: SDUPTHER

## 2018-07-27 RX ORDER — IBUPROFEN 600 MG/1
600 TABLET ORAL EVERY 6 HOURS
Qty: 40 TABLET | Refills: 1 | Status: SHIPPED | OUTPATIENT
Start: 2018-07-27

## 2018-07-27 RX ADMIN — MUPIROCIN 1 G: 20 OINTMENT TOPICAL at 08:07

## 2018-07-27 RX ADMIN — IBUPROFEN 600 MG: 600 TABLET ORAL at 05:07

## 2018-07-27 RX ADMIN — PANTOPRAZOLE SODIUM 40 MG: 40 TABLET, DELAYED RELEASE ORAL at 08:07

## 2018-07-27 RX ADMIN — DOCUSATE SODIUM AND SENNOSIDES 1 TABLET: 8.6; 5 TABLET, FILM COATED ORAL at 08:07

## 2018-07-27 RX ADMIN — OXYCODONE HYDROCHLORIDE AND ACETAMINOPHEN 1 TABLET: 5; 325 TABLET ORAL at 09:07

## 2018-07-27 RX ADMIN — LEVOTHYROXINE SODIUM 100 MCG: 25 TABLET ORAL at 05:07

## 2018-07-27 RX ADMIN — OXYCODONE HYDROCHLORIDE AND ACETAMINOPHEN 1 TABLET: 5; 325 TABLET ORAL at 05:07

## 2018-07-27 NOTE — PROGRESS NOTES
Pt dc'd home per MD order. PIV removed; catheter intact. Dc instructions given including medication reconciliation, Rx, and f/u appt. Pt denies any questions or concerns. Pt to leave floor via wheelchair escorted by transport.

## 2018-07-27 NOTE — DISCHARGE SUMMARY
[facility]  Obstetrics & Gynecology  Discharge Summary    Patient Name: Nicolle Kirk  MRN: [id number]  Admission Date: 2018  Hospital Length of Stay: 2 days  Discharge Date and Time:  2018 7:54 AM  Attending Physician: Michael Childress MD   Discharging Provider: Michael Childress MD  Primary Care Provider: Savita Henson MD    HPI:  46 yo  with long history of symptomatic uterine fibroids with menorrhagia and dysmenorrhea     Pelvic ultrasound on 3/23/2018 shows  There is a lobulated uterus that measures approximately 13.5 x 10.4 x 8.7 cm with significant distortion of the endometrial stripe.  There is a large heterogeneous uterine fibroid along the posterior body of the uterus measuring approximately 8.1 x 8.4 x 6.7 cm.  The large uterine fibroid displaces and distorts the endometrial stripe.  In the anterior lower uterine segment there is a small fibroid that measures 2 x 3 x 2.4 cm.  The right ovary measures 3.8 x 3.2 x 2.7 cm.  Multiple follicles or a large septated cyst measures approximately 3.2 x 2.5 x 2 cm.  No abnormal fluid collections in the adnexa.  The left ovary measures 3.3 x 2.85 cm with normal vascular flow.     Previous  sections x 3 with tubal ligation     Severe obesity, hypothyroidism, type 2 Diabetes Mellitus, chronic hypertension, anemia.  Had surgical clearance from her PCP, Dr Brandon Sequeira.     Here for TAHBSx  Again, procedures explained  Questions answered  Consents signed  She is nervous but eager to proceed    Hospital Course:  TAHBSx performed on 2018 without any difficulty    Postoperative course was benign.  She is tolerating oral intake well.  Exam was benign with patient afebrile, vitals stable, and minimal bleeding.  Normal activities.  Patient discharged home on postoperative day #2, 2018   Discharge medications include Percocet, Motrin.  Follow-up with me next week for dressing removal    Michael Childress MD.      Procedure(s)  (LRB):  HYSTERECTOMY, TOTAL, ABDOMINAL (N/A)     Consults         Status Ordering Provider     Inpatient consult to Anesthesiology  Once     Provider:  Taco Brice MD    Acknowledged MICHAEL REYNOLDS          Significant Diagnostic Studies: Labs:   CBC   Recent Labs  Lab 07/26/18  0655   WBC 9.26   HGB 10.7*   HCT 33.4*       and All labs within the past 24 hours have been reviewed    Pending Diagnostic Studies:     None        Final Active Diagnoses:    Diagnosis Date Noted POA    PRINCIPAL PROBLEM:  Status post total abdominal hysterectomy [Z90.710] 07/25/2018 No    Essential hypertension [I10] 12/19/2017 Yes    Obesity, Class III, BMI 40-49.9 (morbid obesity) [E66.01] 12/19/2017 Yes    Type 2 diabetes mellitus without complication, without long-term current use of insulin [E11.9] 12/19/2017 Yes    Acquired hypothyroidism [E03.9] 12/19/2017 Yes      Problems Resolved During this Admission:    Diagnosis Date Noted Date Resolved POA    Uterine leiomyoma [D25.9] 07/25/2018 07/25/2018 Yes        Discharged Condition: good    Disposition: Home or Self Care    Follow Up:  Follow-up Information     Michael Reynolds MD In 1 week.    Specialties:  Obstetrics and Gynecology, Obstetrics and Gynecology  Contact information:  [address] [place], [place] [id number] [phone]                 Patient Instructions:     Call MD for:  temperature >100.4     Call MD for:  persistent nausea and vomiting or diarrhea     Call MD for:  severe uncontrolled pain     Call MD for:  redness, tenderness, or signs of infection (pain, swelling, redness, odor or green/yellow discharge around incision site)     Call MD for:  difficulty breathing or increased cough     Call MD for:  severe persistent headache     Call MD for:  worsening rash     Call MD for:  persistent dizziness, light-headedness, or visual disturbances     Call MD for:  increased confusion or weakness     No dressing needed       Medications:  Reconciled Home  Medications:      Medication List      START taking these medications    oxyCODONE-acetaminophen 5-325 mg per tablet  Commonly known as:  PERCOCET  Take 1 tablet by mouth every 4 (four) hours as needed.        CHANGE how you take these medications    ibuprofen 600 MG tablet  Commonly known as:  ADVIL,MOTRIN  Take 1 tablet (600 mg total) by mouth every 6 (six) hours.  What changed:  when to take this        CONTINUE taking these medications    ferrous sulfate 324 mg (65 mg iron) Tbec  Take 325 mg by mouth once daily.     levothyroxine 100 MCG tablet  Commonly known as:  SYNTHROID  TAKE 1 TABLET BY MOUTH EVERY DAY ON EMPTY STOMACH IN THE MORNING        STOP taking these medications    cetirizine 10 MG tablet  Commonly known as:  ZYRTEC     hydroCHLOROthiazide 12.5 mg capsule  Commonly known as:  MICROZIDE     metFORMIN 500 MG 24 hr tablet  Commonly known as:  GLUCOPHAGE-XR     omeprazole 20 MG capsule  Commonly known as:  PRILOSEC            Michael Childress MD  Obstetrics & Gynecology  [facility]

## 2018-07-27 NOTE — ASSESSMENT & PLAN NOTE
Patient discharged home on postoperative day #2, 7/27/2018   Discharge medications include Percocet, Motrin.  Follow-up with me next week for dressing removal

## 2018-08-01 ENCOUNTER — TELEPHONE (OUTPATIENT)
Dept: OBSTETRICS AND GYNECOLOGY | Facility: CLINIC | Age: 48
End: 2018-08-01

## 2018-08-01 DIAGNOSIS — Z90.710 STATUS POST TOTAL ABDOMINAL HYSTERECTOMY: ICD-10-CM

## 2018-08-01 RX ORDER — OXYCODONE AND ACETAMINOPHEN 5; 325 MG/1; MG/1
1 TABLET ORAL EVERY 4 HOURS PRN
Qty: 20 TABLET | Refills: 0 | Status: SHIPPED | OUTPATIENT
Start: 2018-08-01 | End: 2020-02-20

## 2018-08-01 NOTE — TELEPHONE ENCOUNTER
----- Message from Eugenia Gonzáles sent at 8/1/2018  8:28 AM CDT -----  Contact: Self/ [phone]  Refill: [oxyCODONE-acetaminophen (PERCOCET) 5-325 mg per tablet] Please call with status. Thank you.    [facility]/pharmacy #[id number] [place] [id number] [address] [address]  Phone: [phone] Fax: [phone]

## 2018-08-02 ENCOUNTER — OFFICE VISIT (OUTPATIENT)
Dept: OBSTETRICS AND GYNECOLOGY | Facility: CLINIC | Age: 48
End: 2018-08-02
Payer: MEDICAID

## 2018-08-02 VITALS
TEMPERATURE: 99 F | SYSTOLIC BLOOD PRESSURE: 120 MMHG | BODY MASS INDEX: 43.75 KG/M2 | WEIGHT: 246.94 LBS | DIASTOLIC BLOOD PRESSURE: 70 MMHG | HEIGHT: 63 IN

## 2018-08-02 DIAGNOSIS — Z09 POSTOP CHECK: ICD-10-CM

## 2018-08-02 DIAGNOSIS — Z90.710 STATUS POST TOTAL ABDOMINAL HYSTERECTOMY: Primary | ICD-10-CM

## 2018-08-02 PROCEDURE — 99999 PR PBB SHADOW E&M-EST. PATIENT-LVL III: CPT | Mod: PBBFAC,,, | Performed by: OBSTETRICS & GYNECOLOGY

## 2018-08-02 PROCEDURE — 99024 POSTOP FOLLOW-UP VISIT: CPT | Mod: ,,, | Performed by: OBSTETRICS & GYNECOLOGY

## 2018-08-02 PROCEDURE — 99213 OFFICE O/P EST LOW 20 MIN: CPT | Mod: PBBFAC | Performed by: OBSTETRICS & GYNECOLOGY

## 2018-08-02 NOTE — PROGRESS NOTES
Subjective:       Patient ID: Nicolle Kirk is a 48 y.o. female.    Chief Complaint:  Post-op Evaluation (1 wk po for JASMYNE on 18)      History of Present Illness  HPI  Ms Kirk is a 48 years old, status post total abdominal hysterectomy with bilateral salpingectomy on 2018.  She comes in today for exam and followup.  Patient has no current complaints.  No fever or chills.  No nausea or vomiting.  No diarrhea or constipation.  No abdominal or pelvic pain.    She has NOT resumed normal intercourse.  Patient has begun some walking for exercise. She denies having signs and symptoms of significant depression.  She is tolerating oral intake well.      GYN & OB History  Patient's last menstrual period was 2018 (exact date).   Date of Last Pap: 2017    OB History    Para Term  AB Living   3 3 2 1   4   SAB TAB Ectopic Multiple Live Births         1 4      # Outcome Date GA Lbr [place]/2nd Weight Sex Delivery Anes PTL Lv   3A   35w0d  2.977 kg (6 lb 9 oz) F CS-LTranv  Y MIRA   3B   35w0d  3.005 kg (6 lb 10 oz) M CS-LTranv  Y MIRA   2 Term  38w0d  2.977 kg (6 lb 9 oz) F CS-LTranv  N MIRA   1 Term  40w0d  2.92 kg (6 lb 7 oz) F Vag-Spont  N MIRA        Past Medical History:   Diagnosis Date    Diabetes mellitus     taking medication prn only    Fibroids     Hypertension     Obesity, Class III, BMI 40-49.9 (morbid obesity)     Thyroid disease     Vaginal delivery     x1       Past Surgical History:   Procedure Laterality Date     SECTION      x2    ORIF left arm Left 2012    Fall on wet floor    TOTAL ABDOMINAL HYSTERECTOMY N/A 2018    Procedure: HYSTERECTOMY, TOTAL, ABDOMINAL;  Surgeon: Michael Childress MD;  [facility];  Service: OB/GYN;  Laterality: N/A;  RN PRE OP 18    TUBAL LIGATION         Family History   Problem Relation Age of Onset    Hyperthyroidism Paternal Grandmother     Diabetes Father     Hypertension Father      Diabetes Mother     Hypertension Mother        Social History     Social History    Marital status:      Spouse name: N/A    Number of children: N/A    Years of education: N/A     Social History Main Topics    Smoking status: Never Smoker    Smokeless tobacco: Never Used    Alcohol use Yes      Comment: occasionally    Drug use: Yes     Types: Marijuana    Sexual activity: Yes     Partners: Male     Birth control/ protection: See Surgical Hx     Other Topics Concern    None     Social History Narrative    Together since 7/1/2016    He is a .  Now working as a     She is a homemaker       Current Outpatient Prescriptions   Medication Sig Dispense Refill    ferrous sulfate 324 mg (65 mg iron) TbEC Take 325 mg by mouth once daily.      ibuprofen (ADVIL,MOTRIN) 600 MG tablet Take 1 tablet (600 mg total) by mouth every 6 (six) hours. 40 tablet 1    levothyroxine (SYNTHROID) 100 MCG tablet TAKE 1 TABLET BY MOUTH EVERY DAY ON EMPTY STOMACH IN THE MORNING  2    oxyCODONE-acetaminophen (PERCOCET) 5-325 mg per tablet Take 1 tablet by mouth every 4 (four) hours as needed. 20 tablet 0     No current facility-administered medications for this visit.        Review of patient's allergies indicates:  No Known Allergies    Review of Systems  Review of Systems   Constitutional: Negative for activity change, appetite change, chills, fatigue, fever and unexpected weight change.   HENT: Negative for mouth sores.    Respiratory: Negative for cough, shortness of breath and wheezing.    Cardiovascular: Negative for chest pain and palpitations.   Gastrointestinal: Positive for abdominal pain. Negative for bloating, blood in stool, constipation, nausea and vomiting.        Incisional pain   Endocrine: Negative for diabetes and hot flashes.   Genitourinary: Negative for dyspareunia, dysuria, frequency, hematuria, menorrhagia, menstrual problem, pelvic pain, urgency, vaginal bleeding, vaginal  discharge, vaginal pain, dysmenorrhea, urinary incontinence, postcoital bleeding and vaginal odor.   Musculoskeletal: Negative for back pain and myalgias.   Skin:  Negative for rash.   Neurological: Negative for seizures and headaches.   Psychiatric/Behavioral: Negative for depression and sleep disturbance. The patient is not nervous/anxious.    Breast: Negative for breast mass, breast pain and nipple discharge          Objective:    Physical Exam:   Constitutional: She appears well-developed and well-nourished. No distress.    HENT:   Head: Normocephalic and atraumatic.    Eyes: EOM are normal.    Neck: Normal range of motion.    Cardiovascular: Normal rate.     Pulmonary/Chest: Effort normal. No respiratory distress.        Abdominal: Soft. She exhibits no distension. There is no tenderness. There is no rebound and no guarding.   Pfannenstiel incision in AquaCel dressing.  No evidence of active bleeding.  Dressing removed.  Incision is clean, dry, intact.  Healing well without any evidence of infection.               Musculoskeletal: Normal range of motion.       Neurological: She is alert.    Skin: Skin is warm and dry.    Psychiatric: She has a normal mood and affect.          Assessment:        1. Status post total abdominal hysterectomy    2. Postop check              Plan:      I have discussed with the patient her condition.  She is doing well with respect to surgery.  She will continue to keep her incision clean and dry to promote proper healing.  She will be back in 4 weeks for follow-up.  She can come back sooner if she needs us.

## 2018-10-31 ENCOUNTER — OFFICE VISIT (OUTPATIENT)
Dept: OBSTETRICS AND GYNECOLOGY | Facility: CLINIC | Age: 48
End: 2018-10-31
Payer: MEDICAID

## 2018-10-31 VITALS
WEIGHT: 246.94 LBS | HEIGHT: 63 IN | BODY MASS INDEX: 43.75 KG/M2 | SYSTOLIC BLOOD PRESSURE: 110 MMHG | DIASTOLIC BLOOD PRESSURE: 80 MMHG

## 2018-10-31 DIAGNOSIS — E66.01 OBESITY, CLASS III, BMI 40-49.9 (MORBID OBESITY): ICD-10-CM

## 2018-10-31 DIAGNOSIS — Z90.710 STATUS POST TOTAL ABDOMINAL HYSTERECTOMY: Primary | ICD-10-CM

## 2018-10-31 DIAGNOSIS — Z12.31 VISIT FOR SCREENING MAMMOGRAM: ICD-10-CM

## 2018-10-31 DIAGNOSIS — E11.9 TYPE 2 DIABETES MELLITUS WITHOUT COMPLICATION, WITHOUT LONG-TERM CURRENT USE OF INSULIN: ICD-10-CM

## 2018-10-31 DIAGNOSIS — E03.9 ACQUIRED HYPOTHYROIDISM: ICD-10-CM

## 2018-10-31 DIAGNOSIS — I10 ESSENTIAL HYPERTENSION: ICD-10-CM

## 2018-10-31 PROCEDURE — 99999 PR PBB SHADOW E&M-EST. PATIENT-LVL III: CPT | Mod: PBBFAC,,, | Performed by: OBSTETRICS & GYNECOLOGY

## 2018-10-31 PROCEDURE — 99213 OFFICE O/P EST LOW 20 MIN: CPT | Mod: S$PBB,,, | Performed by: OBSTETRICS & GYNECOLOGY

## 2018-10-31 PROCEDURE — 99213 OFFICE O/P EST LOW 20 MIN: CPT | Mod: PBBFAC | Performed by: OBSTETRICS & GYNECOLOGY

## 2018-10-31 RX ORDER — HYDROCHLOROTHIAZIDE 12.5 MG/1
12.5 CAPSULE ORAL DAILY
Refills: 5 | COMMUNITY
Start: 2018-10-21 | End: 2021-03-04

## 2018-10-31 RX ORDER — OMEPRAZOLE 20 MG/1
20 CAPSULE, DELAYED RELEASE ORAL DAILY
Refills: 5 | COMMUNITY
Start: 2018-10-21

## 2018-10-31 RX ORDER — CETIRIZINE HYDROCHLORIDE 10 MG/1
TABLET ORAL
Refills: 2 | COMMUNITY
Start: 2018-10-18

## 2018-10-31 NOTE — PROGRESS NOTES
Subjective:       Patient ID: Nicolle Kirk is a 48 y.o. female.    Chief Complaint:  Post-op Evaluation (6wks po JASMYNE )      History of Present Illness  HPI  Ms Kirk is a 48 years old, status post total abdominal hysterectomy with bilateral salpingectomy on 2018.  She comes in today for exam and followup.  Patient has no current complaints.  No fever or chills.  No nausea or vomiting.  No diarrhea or constipation.  No abdominal or pelvic pain.    She has resumed normal intercourse.  Patient has begun some walking for exercise. She denies having signs and symptoms of significant depression.  She is tolerating oral intake well.      GYN & OB History  Patient's last menstrual period was 2018 (exact date).   Date of Last Pap: 2017    OB History    Para Term  AB Living   3 3 2 1   4   SAB TAB Ectopic Multiple Live Births         1 4      # Outcome Date GA Lbr [place]/2nd Weight Sex Delivery Anes PTL Lv   3A   35w0d  2.977 kg (6 lb 9 oz) F CS-LTranv  Y MIRA   3B   35w0d  3.005 kg (6 lb 10 oz) M CS-LTranv  Y MIRA   2 Term  38w0d  2.977 kg (6 lb 9 oz) F CS-LTranv  N MIRA   1 Term  40w0d  2.92 kg (6 lb 7 oz) F Vag-Spont  N MIRA        Past Medical History:   Diagnosis Date    Diabetes mellitus     taking medication prn only    Fibroids     Hypertension     Obesity, Class III, BMI 40-49.9 (morbid obesity)     Thyroid disease     Vaginal delivery     x1       Past Surgical History:   Procedure Laterality Date     SECTION      x2    HYSTERECTOMY, TOTAL, ABDOMINAL N/A 2018    Performed by Michael Childress MD at [facility] OR    [facility] left arm Left 2012    Fall on wet floor    TOTAL ABDOMINAL HYSTERECTOMY N/A 2018    Procedure: HYSTERECTOMY, TOTAL, ABDOMINAL;  Surgeon: Michael Childress MD;  Location: [facility] OR;  Service: OB/GYN;  Laterality: N/A;  RN PRE OP 18    TUBAL LIGATION         Family History   Problem Relation Age of Onset     Hyperthyroidism Paternal Grandmother     Diabetes Father     Hypertension Father     Diabetes Mother     Hypertension Mother        Social History     Socioeconomic History    Marital status:      Spouse name: None    Number of children: None    Years of education: None    Highest education level: None   Social Needs    Financial resource strain: None    Food insecurity - worry: None    Food insecurity - inability: None    Transportation needs - medical: None    Transportation needs - non-medical: None   Occupational History    None   Tobacco Use    Smoking status: Never Smoker    Smokeless tobacco: Never Used   Substance and Sexual Activity    Alcohol use: Yes     Comment: occasionally    Drug use: Yes     Types: Marijuana    Sexual activity: Yes     Partners: Male     Birth control/protection: See Surgical Hx   Other Topics Concern    None   Social History Narrative    Together since 7/1/2016    He is a .  Now working as a     She is a homemaker       Current Outpatient Medications   Medication Sig Dispense Refill    cetirizine (ZYRTEC) 10 MG tablet TAKE 1 TABLET BY MOUTH EVERY DAY AS NEEDED FOR ALLERGIES 30  2    hydroCHLOROthiazide (MICROZIDE) 12.5 mg capsule Take 12.5 mg by mouth once daily.  5    levothyroxine (SYNTHROID) 100 MCG tablet TAKE 1 TABLET BY MOUTH EVERY DAY ON EMPTY STOMACH IN THE MORNING  2    omeprazole (PRILOSEC) 20 MG capsule Take 20 mg by mouth once daily.  5    ferrous sulfate 324 mg (65 mg iron) TbEC Take 325 mg by mouth once daily.      ibuprofen (ADVIL,MOTRIN) 600 MG tablet Take 1 tablet (600 mg total) by mouth every 6 (six) hours. 40 tablet 1    oxyCODONE-acetaminophen (PERCOCET) 5-325 mg per tablet Take 1 tablet by mouth every 4 (four) hours as needed. 20 tablet 0     No current facility-administered medications for this visit.        Review of patient's allergies indicates:  No Known Allergies    Review of Systems  Review of  Systems   Constitutional: Negative for activity change, appetite change, chills, fatigue, fever and unexpected weight change.   HENT: Negative for mouth sores.    Respiratory: Negative for cough, shortness of breath and wheezing.    Cardiovascular: Negative for chest pain and palpitations.   Gastrointestinal: Negative for abdominal pain, bloating, blood in stool, constipation, nausea and vomiting.        Occasional incisional pain   Endocrine: Negative for diabetes and hot flashes.   Genitourinary: Negative for dyspareunia, dysuria, frequency, hematuria, menorrhagia, menstrual problem, pelvic pain, urgency, vaginal bleeding, vaginal discharge, vaginal pain, dysmenorrhea, urinary incontinence, postcoital bleeding and vaginal odor.   Musculoskeletal: Negative for back pain and myalgias.   Skin:  Negative for rash.   Neurological: Negative for seizures and headaches.   Psychiatric/Behavioral: Negative for depression and sleep disturbance. The patient is not nervous/anxious.    Breast: Negative for breast mass, breast pain and nipple discharge          Objective:    Physical Exam:   Constitutional: She appears well-developed and well-nourished. No distress.    HENT:   Head: Normocephalic and atraumatic.    Eyes: EOM are normal.    Neck: Normal range of motion.     Pulmonary/Chest: Effort normal. No respiratory distress.   Breasts: Non-tender, no engorgement, no masses, no retraction, no discharge. Negative for lymphadenopathy.         Abdominal: Soft. She exhibits no distension. There is no tenderness. There is no rebound and no guarding.   Pfannenstiel scar      Genitourinary: Vagina normal. No vaginal discharge found.   Genitourinary Comments: No atrophy.  Vulva without any obvious lesions.  Vaginal vault with good support.  Minimal white discharge noted.  No obvious lesion.  Vaginal cuff intact and well-supported.  Small granulation tissue.  Cervix and Uterus are surgically absent.  Adnexa is without any masses or  tenderness.           Musculoskeletal: Normal range of motion.       Neurological: She is alert.    Skin: Skin is warm and dry.    Psychiatric: She has a normal mood and affect.          Assessment:        1. Status post total abdominal hysterectomy    2. Essential hypertension    3. Type 2 diabetes mellitus without complication, without long-term current use of insulin    4. Obesity, Class III, BMI 40-49.9 (morbid obesity)    5. Acquired hypothyroidism             Plan:      I have discussed with the patient regarding her condition  She has recovered well    Medical conditions better controlled  Needs to be back to PCP    Back for annual next year.  Mammogram ordered for next month

## 2019-05-07 ENCOUNTER — OFFICE VISIT (OUTPATIENT)
Dept: ORTHOPEDICS | Facility: CLINIC | Age: 49
End: 2019-05-07
Payer: MEDICAID

## 2019-05-07 ENCOUNTER — TELEPHONE (OUTPATIENT)
Dept: ORTHOPEDICS | Facility: CLINIC | Age: 49
End: 2019-05-07

## 2019-05-07 VITALS — BODY MASS INDEX: 43.59 KG/M2 | HEIGHT: 63 IN | WEIGHT: 246 LBS

## 2019-05-07 DIAGNOSIS — M71.22 BAKER'S CYST OF KNEE, LEFT: Primary | ICD-10-CM

## 2019-05-07 DIAGNOSIS — M25.562 LEFT KNEE PAIN, UNSPECIFIED CHRONICITY: Primary | ICD-10-CM

## 2019-05-07 PROCEDURE — 99999 PR PBB SHADOW E&M-EST. PATIENT-LVL II: CPT | Mod: PBBFAC,,, | Performed by: ORTHOPAEDIC SURGERY

## 2019-05-07 PROCEDURE — 99212 OFFICE O/P EST SF 10 MIN: CPT | Mod: PBBFAC,PN | Performed by: ORTHOPAEDIC SURGERY

## 2019-05-07 PROCEDURE — 99999 PR PBB SHADOW E&M-EST. PATIENT-LVL II: ICD-10-PCS | Mod: PBBFAC,,, | Performed by: ORTHOPAEDIC SURGERY

## 2019-05-07 PROCEDURE — 99202 OFFICE O/P NEW SF 15 MIN: CPT | Mod: S$PBB,,, | Performed by: ORTHOPAEDIC SURGERY

## 2019-05-07 PROCEDURE — 99202 PR OFFICE/OUTPT VISIT, NEW, LEVL II, 15-29 MIN: ICD-10-PCS | Mod: S$PBB,,, | Performed by: ORTHOPAEDIC SURGERY

## 2019-05-07 NOTE — PROGRESS NOTES
Subjective:      Patient ID: Nicolle Kirk is a 48 y.o. female.    Chief Complaint: Swelling and Pain of the Left Knee    HPI     They have experienced problems with their left knee over the past 1 month. The patient denies relevant history of injury/aggravation. Pain is located Posteriorly Associated symptoms include NA.  Symptoms are aggravated by laying in bed with the leg straight. They have been treated with NSAIDS.   Symptoms have recently waxed and waned. Ambulation reportedly has not been impaired. Self care ADLs are not painful.     Review of Systems   Constitution: Negative for fever and weight loss.   HENT: Negative for congestion.    Eyes: Negative for visual disturbance.   Cardiovascular: Negative for chest pain.   Respiratory: Negative for shortness of breath.    Hematologic/Lymphatic: Negative for bleeding problem. Does not bruise/bleed easily.   Skin: Negative for poor wound healing.   Musculoskeletal: Positive for joint pain and joint swelling.   Gastrointestinal: Negative for abdominal pain.   Genitourinary: Negative for dysuria.   Neurological: Negative for seizures.   Psychiatric/Behavioral: Negative for altered mental status.   Allergic/Immunologic: Negative for persistent infections.         Objective:      Ortho/SPM Exam          Left knee      The patient is not in acute distress.   Body habitus is normal.   Sclera appear normal  No respiratory distress  The patient walks without a limp.  Resisted SLR negative.   The skin over the knee is intact.  Knee effusion 0  Tendernes is located absent.  Range of motion- Flexion full, Extension full.   Ligament exam:   MCL intact   Lachman intact              Post sag intact    LCL intact  Patellar apprehension negative.  Popliteal cyst positive  Patellar crepitation absent.  Flexion/pinch negative.  Pulses DP present, PT present.  Motor normal 5/5 strength in all tested muscle groups.   Sensory normal.    I reviewed the relevant radiographic  images for the patient's condition:  Recent left knee films are normal for the patient's age    Assessment:       Encounter Diagnosis   Name Primary?    Baker's cyst of knee, left Yes          Plan:       Nicolle was seen today for swelling and pain.    Diagnoses and all orders for this visit:    Baker's cyst of knee, left        I explained my diagnostic impression and the reasoning behind it in detail, using layman's terms.  Models and/or pictures were used to help in the explanation.    Natural history of the condition explained    Occasional intermittent usage of ibuprofen was explained.

## 2019-05-07 NOTE — LETTER
May 7, 2019      Savita Henson MD  [address] [place] [address]           [facility]  [address]  [address]  [place] [address]  Phone: [phone]  Fax: [phone]          Patient: Nicolle Kirk   MR Number: [id number]   YOB: 1970   Date of Visit: 5/7/2019       Dear Dr. Savita Henson:    Thank you for referring Nicolle Kirk to me for evaluation. Attached you will find relevant portions of my assessment and plan of care.    If you have questions, please do not hesitate to call me. I look forward to following Nicolle Kirk along with you.    Sincerely,    Yuan Silvestre MD    Enclosure  CC:  No Recipients    If you would like to receive this communication electronically, please contact [email or web address] or [phone] to request more information on [employer] Link access.    For providers and/or their staff who would like to refer a patient to [facility], please contact us through our one-stop-shop provider referral line, [facility], at [phone].    If you feel you have received this communication in error or would no longer like to receive these types of communications, please e-mail [email or web address]

## 2020-02-12 NOTE — PROGRESS NOTES
[facility]  History & Physical  Orthopedics    SUBJECTIVE:      ([place])    Chief Complaint: right hand numbness and tingling    Referring Provider: Savita Henson MD     History of Present Illness:  Patient is a 49 y.o. right hand dominant female who presents today with complaints of intermittent numbness/tingling and pain in entire right hand.     The patient is a/an homemaker.    Onset of symptoms/DOI was 1 year ago, worse in last 3-6 months. .    Symptoms are aggravated by holding things in her hand, driving, and at night.    Symptoms are alleviated by rest.    Symptoms consist of pain and numbness and tingling.    The patient rates their pain as a 3/10.    Attempted treatment(s) and/or interventions include rest and activity modification. No improvement with motrin.      The patient denies any fevers, chills, N/V, D/C and presents for evaluation.       Past Medical History:   Diagnosis Date    Diabetes mellitus     taking medication prn only    Fibroids     Hypertension     Obesity, Class III, BMI 40-49.9 (morbid obesity)     Thyroid disease     Vaginal delivery     x1     Past Surgical History:   Procedure Laterality Date     SECTION      x2    ORIF left arm Left 2012    Fall on wet floor    TOTAL ABDOMINAL HYSTERECTOMY N/A 2018    Procedure: HYSTERECTOMY, TOTAL, ABDOMINAL;  Surgeon: Michael Childress MD;  Location: [facility];  Service: OB/GYN;  Laterality: N/A;  RN PRE OP 18    TUBAL LIGATION       Review of patient's allergies indicates:  No Known Allergies  Social History     Social History Narrative    Together since 2016    He is a .  Now working as a     She is a homemaker     Family History   Problem Relation Age of Onset    Hyperthyroidism Paternal Grandmother     Diabetes Father     Hypertension Father     Diabetes Mother     Hypertension Mother          Current Outpatient Medications:     cetirizine (ZYRTEC) 10 MG tablet,  TAKE 1 TABLET BY MOUTH EVERY DAY AS NEEDED FOR ALLERGIES 30, Disp: , Rfl: 2    fenofibrate (TRICOR) 145 MG tablet, Take 1 tablet by mouth once daily., Disp: , Rfl:     folic acid (FOLVITE) 1 MG tablet, Take 1 tablet by mouth once daily., Disp: , Rfl:     hydroCHLOROthiazide (MICROZIDE) 12.5 mg capsule, Take 12.5 mg by mouth once daily., Disp: , Rfl: 5    ibuprofen (ADVIL,MOTRIN) 600 MG tablet, Take 1 tablet (600 mg total) by mouth every 6 (six) hours., Disp: 40 tablet, Rfl: 1    levothyroxine (SYNTHROID) 100 MCG tablet, TAKE 1 TABLET BY MOUTH EVERY DAY ON EMPTY STOMACH IN THE MORNING, Disp: , Rfl: 2    metFORMIN (GLUCOPHAGE) 500 MG tablet, Take 1 tablet by mouth once daily., Disp: , Rfl:     ferrous sulfate 324 mg (65 mg iron) TbEC, Take 325 mg by mouth once daily., Disp: , Rfl:     omeprazole (PRILOSEC) 20 MG capsule, Take 20 mg by mouth once daily., Disp: , Rfl: 5      Review of Systems   Constitutional: Negative for chills, fever, malaise/fatigue and weight loss.   HENT: Negative for hearing loss, nosebleeds and tinnitus.    Eyes: Negative for blurred vision and double vision.   Respiratory: Negative for cough, hemoptysis, shortness of breath and wheezing.    Cardiovascular: Negative for chest pain and palpitations.   Gastrointestinal: Negative for blood in stool, diarrhea, nausea and vomiting.   Genitourinary: Negative for dysuria, frequency, hematuria and urgency.   Skin: Negative for rash.   Neurological: Positive for tingling. Negative for dizziness, seizures, weakness and headaches.        Positive for numbness.    Endo/Heme/Allergies: Negative for polydipsia. Does not bruise/bleed easily.   Psychiatric/Behavioral: Negative for depression. The patient is not nervous/anxious.        OBJECTIVE:      Vital Signs (Most Recent):  Vitals:    02/20/20 0845   BP: 122/80   BP Location: Right arm   Patient Position: Sitting   BP Method: X-Large (Manual)   Pulse: 77   Resp: 16   Weight: 117.1 kg (258 lb 3.2 oz)  "  Height: 5' 3" (1.6 m)     Body mass index is 45.74 kg/m².      General  Constitutional: patient is oriented to person, place, and time. He appears well-developed and well-nourished.   Pulmonary/Chest: Effort normal.   Abdominal: patient exhibits no distension.   Neurological: patient is alert and oriented to person, place, and time.   Psychiatric: patient has a normal mood and affect. His behavior is normal. Judgment and thought content normal.       RIGHT Hand/Wrist Examination:    Observation/Inspection:  Swelling  none    Deformity  none  Discoloration  none     Scars   none    Atrophy  none    HAND/WRIST EXAMINATION:  Finkelstein's Test   Neg  WHAT Test    Neg  Snuff box tenderness   Neg  Hook of Hamate Tenderness  Neg  CMC grind    Neg    Neurovascular Exam:  Digits WWP, brisk CR < 3s throughout  NVI motor/LTS to M/R/U nerves, radial pulse 2+  Tinel's Test - Carpal Tunnel  positive  Tinel's Test - Cubital Tunnel  Neg  Phalen's Test    positive  Median Nerve Compression Test positive    ROM hand/wrist/elbow full, painless        LEFT Hand/Wrist Examination:    Observation/Inspection:  Swelling  none    Deformity  none  Discoloration  none     Scars   none    Atrophy  none    HAND/WRIST EXAMINATION:  Finkelstein's Test   Neg  WHAT Test    Neg  Snuff box tenderness   Neg  Hook of Hamate Tenderness  Neg  CMC grind    Neg    Neurovascular Exam:  Digits WWP, brisk CR < 3s throughout  NVI motor/LTS to M/R/U nerves, radial pulse 2+  Tinel's Test - Carpal Tunnel  Neg  Tinel's Test - Cubital Tunnel  Neg  Phalen's Test    Neg  Median Nerve Compression Test Neg    ROM hand/wrist/elbow full, painless      XRAY INTERPRETATION:   X-rays of right hand and wrist dated 2/20/20 are personally reviewed and show no acute displaced fracture, dislocation or suspicious osseous lesion. Note is made of negative ulnar variance without evidence of lunate malacia. Anatomic alignment is otherwise maintained.      ASSESSMENT/PLAN:      49 " y.o. yo female with 1 year history of numbness tingling and pain in the right hand that has been worse in the last 3-6 months.  Symptoms are worse with activity, at night, and with driving.  X-rays show incidental finding of negative ulnar variance.  Symptoms and exam suspicious for carpal tunnel syndrome on the right.Treatment options reviewed with patient along with above right hand/wrist xrays. Following plan made:     - Given carpal tunnel brace. Can wear during day and at night as needed to help with symptoms.   - EMG/NCS of bilateral UEs to further evaluate right UE numbness/tingling.   - Will call with EMG results and further recommendations.   - Briefly discussed further treatment options including local injection and surgery. She thinks she likely would want to proceed with surgery if EMG is positive.     Will call/email her with EMG results and further recommendations.     ADDENDUM 3/11/20:   [facility] shows mild right carpal tunnel syndrome.     Will call patient with results and make her a follow up to discuss them.

## 2020-02-19 ENCOUNTER — TELEPHONE (OUTPATIENT)
Dept: ORTHOPEDICS | Facility: CLINIC | Age: 50
End: 2020-02-19

## 2020-02-19 DIAGNOSIS — M79.641 HAND PAIN, RIGHT: Primary | ICD-10-CM

## 2020-02-19 DIAGNOSIS — M25.531 WRIST PAIN, RIGHT: ICD-10-CM

## 2020-02-19 NOTE — TELEPHONE ENCOUNTER
I spoke with patient, I informed patient to go to outpatient dept [facility] for hand and wrist x ray tomorrow no later than 8 am , Patient office appointment is for 9:30 am with Louise Hargrove. Dilan/ma

## 2020-02-20 ENCOUNTER — OFFICE VISIT (OUTPATIENT)
Dept: ORTHOPEDICS | Facility: CLINIC | Age: 50
End: 2020-02-20
Payer: MEDICAID

## 2020-02-20 VITALS
HEART RATE: 77 BPM | DIASTOLIC BLOOD PRESSURE: 80 MMHG | HEIGHT: 63 IN | WEIGHT: 258.19 LBS | RESPIRATION RATE: 16 BRPM | SYSTOLIC BLOOD PRESSURE: 122 MMHG | BODY MASS INDEX: 45.75 KG/M2

## 2020-02-20 DIAGNOSIS — M25.531 WRIST PAIN, RIGHT: ICD-10-CM

## 2020-02-20 DIAGNOSIS — M79.641 HAND PAIN, RIGHT: Primary | ICD-10-CM

## 2020-02-20 DIAGNOSIS — R20.0 NUMBNESS AND TINGLING IN RIGHT HAND: Primary | ICD-10-CM

## 2020-02-20 DIAGNOSIS — R20.2 NUMBNESS AND TINGLING IN RIGHT HAND: Primary | ICD-10-CM

## 2020-02-20 PROCEDURE — 99214 PR OFFICE/OUTPT VISIT, EST, LEVL IV, 30-39 MIN: ICD-10-PCS | Mod: S$PBB,,, | Performed by: PHYSICIAN ASSISTANT

## 2020-02-20 PROCEDURE — 99214 OFFICE O/P EST MOD 30 MIN: CPT | Mod: PBBFAC,PN | Performed by: PHYSICIAN ASSISTANT

## 2020-02-20 PROCEDURE — 99214 OFFICE O/P EST MOD 30 MIN: CPT | Mod: S$PBB,,, | Performed by: PHYSICIAN ASSISTANT

## 2020-02-20 PROCEDURE — 99999 PR PBB SHADOW E&M-EST. PATIENT-LVL IV: CPT | Mod: PBBFAC,,, | Performed by: PHYSICIAN ASSISTANT

## 2020-02-20 PROCEDURE — 99999 PR PBB SHADOW E&M-EST. PATIENT-LVL IV: ICD-10-PCS | Mod: PBBFAC,,, | Performed by: PHYSICIAN ASSISTANT

## 2020-02-20 RX ORDER — METFORMIN HYDROCHLORIDE 500 MG/1
1 TABLET ORAL DAILY
COMMUNITY

## 2020-02-20 RX ORDER — FOLIC ACID 1 MG/1
1 TABLET ORAL DAILY
COMMUNITY
Start: 2020-02-14

## 2020-02-20 RX ORDER — FENOFIBRATE 145 MG/1
1 TABLET, FILM COATED ORAL DAILY
COMMUNITY
Start: 2019-04-11

## 2020-02-20 NOTE — LETTER
February 20, 2020      Savita Henson MD  [address] [place] [address]           [facility]  [address] , [facility] [address]  [facility] [address]  Phone: [phone]  Fax: [phone]          Patient: Nicolle Kirk   MR Number: [id number]   YOB: 1970   Date of Visit: 2/20/2020       Dear Dr. Savita Henson:    Thank you for referring Nicolle Kirk to me for evaluation. Attached you will find relevant portions of my assessment and plan of care.    If you have questions, please do not hesitate to call me. I look forward to following Nicolle Kirk along with you.    Sincerely,    SEVERINO Powersosure  CC:  No Recipients    If you would like to receive this communication electronically, please contact [email or web address] or [phone] to request more information on [employer] Link access.    For providers and/or their staff who would like to refer a patient to [facility], please contact us through our one-stop-shop provider referral line, [facility], at [phone].    If you feel you have received this communication in error or would no longer like to receive these types of communications, please e-mail [email or web address]

## 2020-02-20 NOTE — PATIENT INSTRUCTIONS
It was nice to meet you today! I am sorry that you are hurting.     I think your symptoms are coming from carpal tunnel syndrome in the right hand. I want to get a nerve test (EMG) to help diagnose this.      Wear the wrist brace as needed for help with pain. If symptoms are worse during the day, you can wear it then. If you don't have a lot of symptoms at night, then don't wear it.     I will call or email you with the results of the nerve test. If it shows carpal tunnel syndrome then we could consider a steroid injection or have you see the hand surgeon to discuss surgery.     Please stay in touch and call me if you need anything. You can also send me a message in [email or web address].     Louise   [phone]

## 2020-03-04 ENCOUNTER — PROCEDURE VISIT (OUTPATIENT)
Dept: NEUROLOGY | Facility: CLINIC | Age: 50
End: 2020-03-04
Payer: MEDICAID

## 2020-03-04 VITALS — BODY MASS INDEX: 45.75 KG/M2 | HEIGHT: 63 IN | WEIGHT: 258.19 LBS

## 2020-03-04 DIAGNOSIS — R20.0 NUMBNESS AND TINGLING IN RIGHT HAND: ICD-10-CM

## 2020-03-04 DIAGNOSIS — R20.2 NUMBNESS AND TINGLING IN RIGHT HAND: ICD-10-CM

## 2020-03-04 PROCEDURE — 95911 NRV CNDJ TEST 9-10 STUDIES: CPT | Mod: PBBFAC | Performed by: PSYCHIATRY & NEUROLOGY

## 2020-03-04 PROCEDURE — 95911 NRV CNDJ TEST 9-10 STUDIES: CPT | Mod: 26,S$PBB,, | Performed by: PSYCHIATRY & NEUROLOGY

## 2020-03-04 PROCEDURE — 95886 MUSC TEST DONE W/N TEST COMP: CPT | Mod: PBBFAC | Performed by: PSYCHIATRY & NEUROLOGY

## 2020-03-04 PROCEDURE — 95886 PR EMG COMPLETE, W/ NERVE CONDUCTION STUDIES, 5+ MUSCLES: ICD-10-PCS | Mod: 26,S$PBB,, | Performed by: PSYCHIATRY & NEUROLOGY

## 2020-03-04 PROCEDURE — 95911 PR NERVE CONDUCTION STUDY; 9-10 STUDIES: ICD-10-PCS | Mod: 26,S$PBB,, | Performed by: PSYCHIATRY & NEUROLOGY

## 2020-03-04 PROCEDURE — 95886 MUSC TEST DONE W/N TEST COMP: CPT | Mod: 26,S$PBB,, | Performed by: PSYCHIATRY & NEUROLOGY

## 2020-03-11 ENCOUNTER — TELEPHONE (OUTPATIENT)
Dept: ORTHOPEDICS | Facility: CLINIC | Age: 50
End: 2020-03-11

## 2020-03-11 NOTE — TELEPHONE ENCOUNTER
----- Message from Louise Hargrove PA-C sent at 3/11/2020 11:33 AM CDT -----  Please call and let her know that her nerve test showed mild carpal tunnel syndrome on the right. I want to see her back in clinic to discuss further options. We can consider an injection (which I can do) or I can refer her to [facility] for surgery (we do not have a hand surgeon here).     If she knows she wants surgery, then let me know and she does not need to come in. I can mail her a referral.     Thanks.

## 2020-03-11 NOTE — TELEPHONE ENCOUNTER
----- Message from Louise Hargrove PA-C sent at 3/11/2020 11:33 AM CDT -----  Please call and let her know that her nerve test showed mild carpal tunnel syndrome on the right. I want to see her back in clinic to discuss further options. We can consider an injection (which I can do) or I can refer her to [facility] for surgery (we do not have a hand surgeon here).     If she knows she wants surgery, then let me know and she does not need to come in. I can mail her a referral.     Thanks.

## 2020-03-11 NOTE — PROCEDURES
Procedures     Neurology EMG/NCS Note    Patient was referred for EMG/NCS. EMG/NCS was performed. Please see scanned EMG/NCS report for further details.    Patient was advised to follow up with referring physician for further management.    Casi Shipley DO

## 2020-03-12 NOTE — TELEPHONE ENCOUNTER
Spoke to pt, she is leaning towards surgery buy wouldn't mind talking to Louise Hargrove about it, appt made and pt aware of appt date and time

## 2020-03-24 ENCOUNTER — TELEPHONE (OUTPATIENT)
Dept: ORTHOPEDICS | Facility: CLINIC | Age: 50
End: 2020-03-24

## 2020-03-24 NOTE — TELEPHONE ENCOUNTER
Called pt to r/s due to our provider being out and she wanted to wait to r/s after the virus scare is gone. She will call us when she is ready to r/s

## 2021-03-04 ENCOUNTER — OFFICE VISIT (OUTPATIENT)
Dept: OBSTETRICS AND GYNECOLOGY | Facility: CLINIC | Age: 51
End: 2021-03-04
Payer: MEDICAID

## 2021-03-04 VITALS
SYSTOLIC BLOOD PRESSURE: 128 MMHG | WEIGHT: 249.13 LBS | DIASTOLIC BLOOD PRESSURE: 68 MMHG | BODY MASS INDEX: 44.14 KG/M2 | HEIGHT: 63 IN

## 2021-03-04 DIAGNOSIS — Z12.31 SCREENING MAMMOGRAM, ENCOUNTER FOR: ICD-10-CM

## 2021-03-04 DIAGNOSIS — Z01.419 WELL WOMAN EXAM WITH ROUTINE GYNECOLOGICAL EXAM: Primary | ICD-10-CM

## 2021-03-04 PROCEDURE — 99213 OFFICE O/P EST LOW 20 MIN: CPT | Mod: PBBFAC | Performed by: OBSTETRICS & GYNECOLOGY

## 2021-03-04 PROCEDURE — 99396 PR PREVENTIVE VISIT,EST,40-64: ICD-10-PCS | Mod: S$PBB,,, | Performed by: OBSTETRICS & GYNECOLOGY

## 2021-03-04 PROCEDURE — 99396 PREV VISIT EST AGE 40-64: CPT | Mod: S$PBB,,, | Performed by: OBSTETRICS & GYNECOLOGY

## 2021-03-04 PROCEDURE — 99999 PR PBB SHADOW E&M-EST. PATIENT-LVL III: CPT | Mod: PBBFAC,,, | Performed by: OBSTETRICS & GYNECOLOGY

## 2021-03-04 PROCEDURE — 99999 PR PBB SHADOW E&M-EST. PATIENT-LVL III: ICD-10-PCS | Mod: PBBFAC,,, | Performed by: OBSTETRICS & GYNECOLOGY

## 2021-03-04 RX ORDER — HYDROCHLOROTHIAZIDE 25 MG/1
25 TABLET ORAL DAILY
COMMUNITY
Start: 2021-02-19

## (undated) DEVICE — ELECTRODE REM PLYHSV RETURN 9

## (undated) DEVICE — SOL 9P NACL IRR PIC IL

## (undated) DEVICE — CHLORAPREP W TINT 26ML APPL

## (undated) DEVICE — SUT 3/0 36IN COATED VICRYL

## (undated) DEVICE — Device

## (undated) DEVICE — ELECTRODE BLADE TEFLON 6

## (undated) DEVICE — TRAY FOLEY 16FR INFECTION CONT

## (undated) DEVICE — CANISTER SUCTION 2 LTR

## (undated) DEVICE — SEE MEDLINE ITEM 154981

## (undated) DEVICE — SUT VICRYL 1 CT-1 27 UNDIE

## (undated) DEVICE — RETRACTOR TRAXI PANNI EXTENDER

## (undated) DEVICE — PACK ENDOSCOPY GENERAL

## (undated) DEVICE — SEE MEDLINE ITEM 157117

## (undated) DEVICE — SPONGE LAP 18X18 PREWASHED

## (undated) DEVICE — SEE MEDLINE ITEM 146417

## (undated) DEVICE — SUT 3/0 27IN COATED VICRYL

## (undated) DEVICE — COVER OVERHEAD SURG LT BLUE

## (undated) DEVICE — SUT 1 36IN COATED VICRYL UN

## (undated) DEVICE — DEVICE ENSEAL X1 LARGE JAW

## (undated) DEVICE — BLADE SURG #15 CARBON STEEL

## (undated) DEVICE — SUT 0 36IN CHROMIC GUT CT-1

## (undated) DEVICE — SEE MEDLINE ITEM 152622

## (undated) DEVICE — BLANKET UPPER BODY 78.7X29.9IN

## (undated) DEVICE — DRESSING AQUACEL AG 3.5X10IN

## (undated) DEVICE — GLOVE SURGICAL LATEX SZ 7